# Patient Record
Sex: FEMALE | Race: WHITE | NOT HISPANIC OR LATINO | Employment: OTHER | URBAN - METROPOLITAN AREA
[De-identification: names, ages, dates, MRNs, and addresses within clinical notes are randomized per-mention and may not be internally consistent; named-entity substitution may affect disease eponyms.]

---

## 2018-11-12 ENCOUNTER — MEDICAL CORRESPONDENCE (OUTPATIENT)
Dept: HEALTH INFORMATION MANAGEMENT | Facility: CLINIC | Age: 63
End: 2018-11-12

## 2018-11-12 DIAGNOSIS — Z47.1 AFTERCARE FOLLOWING JOINT REPLACEMENT: Primary | ICD-10-CM

## 2018-12-19 ENCOUNTER — HOSPITAL ENCOUNTER (OUTPATIENT)
Dept: LAB | Facility: CLINIC | Age: 63
Discharge: HOME | End: 2018-12-19
Attending: ORTHOPAEDIC SURGERY | Admitting: ORTHOPAEDIC SURGERY
Payer: COMMERCIAL

## 2018-12-19 DIAGNOSIS — Z47.1 AFTERCARE FOLLOWING JOINT REPLACEMENT: ICD-10-CM

## 2018-12-19 LAB
BASOPHILS # BLD AUTO: 0 10E9/L (ref 0–0.2)
BASOPHILS NFR BLD AUTO: 0.4 %
CRP SERPL-MCNC: <2.9 MG/L (ref 0–8)
DIFFERENTIAL METHOD BLD: NORMAL
EOSINOPHIL # BLD AUTO: 0.1 10E9/L (ref 0–0.7)
EOSINOPHIL NFR BLD AUTO: 1.9 %
ERYTHROCYTE [DISTWIDTH] IN BLOOD BY AUTOMATED COUNT: 12.2 % (ref 10–15)
ERYTHROCYTE [SEDIMENTATION RATE] IN BLOOD BY WESTERGREN METHOD: 10 MM/H (ref 0–30)
HCT VFR BLD AUTO: 38.2 % (ref 35–47)
HGB BLD-MCNC: 12.9 G/DL (ref 11.7–15.7)
IMM GRANULOCYTES # BLD: 0 10E9/L (ref 0–0.4)
IMM GRANULOCYTES NFR BLD: 0.2 %
LYMPHOCYTES # BLD AUTO: 1.3 10E9/L (ref 0.8–5.3)
LYMPHOCYTES NFR BLD AUTO: 27 %
MCH RBC QN AUTO: 31 PG (ref 26.5–33)
MCHC RBC AUTO-ENTMCNC: 33.8 G/DL (ref 31.5–36.5)
MCV RBC AUTO: 92 FL (ref 78–100)
MONOCYTES # BLD AUTO: 0.3 10E9/L (ref 0–1.3)
MONOCYTES NFR BLD AUTO: 7.1 %
NEUTROPHILS # BLD AUTO: 2.9 10E9/L (ref 1.6–8.3)
NEUTROPHILS NFR BLD AUTO: 63.4 %
NRBC # BLD AUTO: 0 10*3/UL
NRBC BLD AUTO-RTO: 0 /100
PLATELET # BLD AUTO: 197 10E9/L (ref 150–450)
RBC # BLD AUTO: 4.16 10E12/L (ref 3.8–5.2)
WBC # BLD AUTO: 4.6 10E9/L (ref 4–11)

## 2018-12-19 PROCEDURE — 85025 COMPLETE CBC W/AUTO DIFF WBC: CPT | Performed by: ORTHOPAEDIC SURGERY

## 2018-12-19 PROCEDURE — 36415 COLL VENOUS BLD VENIPUNCTURE: CPT | Performed by: ORTHOPAEDIC SURGERY

## 2018-12-19 PROCEDURE — 85652 RBC SED RATE AUTOMATED: CPT | Performed by: ORTHOPAEDIC SURGERY

## 2018-12-19 PROCEDURE — 86140 C-REACTIVE PROTEIN: CPT | Performed by: ORTHOPAEDIC SURGERY

## 2019-02-25 ENCOUNTER — TRANSFERRED RECORDS (OUTPATIENT)
Dept: HEALTH INFORMATION MANAGEMENT | Facility: CLINIC | Age: 64
End: 2019-02-25

## 2019-02-28 ENCOUNTER — HOSPITAL ENCOUNTER (OUTPATIENT)
Dept: LAB | Facility: CLINIC | Age: 64
Discharge: HOME | End: 2019-02-28
Attending: ORTHOPAEDIC SURGERY | Admitting: ORTHOPAEDIC SURGERY
Payer: COMMERCIAL

## 2019-02-28 DIAGNOSIS — Z01.812 PRE-OPERATIVE LABORATORY EXAMINATION: ICD-10-CM

## 2019-02-28 LAB
MRSA DNA SPEC QL NAA+PROBE: NEGATIVE
SPECIMEN SOURCE: NORMAL

## 2019-02-28 PROCEDURE — 40000830 ZZHCL STATISTIC STAPH AUREUS METH RESIST SCREEN PCR: Performed by: ORTHOPAEDIC SURGERY

## 2019-02-28 PROCEDURE — 87640 STAPH A DNA AMP PROBE: CPT | Performed by: ORTHOPAEDIC SURGERY

## 2019-02-28 PROCEDURE — 87641 MR-STAPH DNA AMP PROBE: CPT | Performed by: ORTHOPAEDIC SURGERY

## 2019-02-28 PROCEDURE — 40000829 ZZHCL STATISTIC STAPH AUREUS SUSCEPT SCREEN PCR: Performed by: ORTHOPAEDIC SURGERY

## 2019-03-01 NOTE — PLAN OF CARE
Patient returned call about MSSA nasal screen. Explained Mupirocin use and need for extra CHG showers. Rx for Mupirocin called to [facility] in [place]  [phone].

## 2019-03-01 NOTE — PLAN OF CARE
Patient returned call about positive MSSA nasal screen. Mupirocin use and need for extra CHG showers discussed and pt verbalizes understanding. Rx for Mupirocin called to [facility] in [place].  [phone].

## 2019-03-01 NOTE — PLAN OF CARE
Message left for patient and Dr. Hilliard about positive MSSA nasal screen. Requested patient call back to go over instructions in more detail.

## 2019-03-19 RX ORDER — ACETAMINOPHEN 325 MG/1
325-650 TABLET ORAL EVERY 6 HOURS PRN
Status: ON HOLD | COMMUNITY
End: 2019-03-22

## 2019-03-19 RX ORDER — TRAZODONE HYDROCHLORIDE 100 MG/1
100 TABLET ORAL AT BEDTIME
COMMUNITY

## 2019-03-19 RX ORDER — NAPROXEN SODIUM 220 MG
220 TABLET ORAL DAILY PRN
Status: ON HOLD | COMMUNITY
End: 2019-03-22

## 2019-03-19 RX ORDER — VENLAFAXINE HYDROCHLORIDE 75 MG/1
75 TABLET, EXTENDED RELEASE ORAL
COMMUNITY
End: 2019-03-21

## 2019-03-19 RX ORDER — VENLAFAXINE HYDROCHLORIDE 150 MG/1
150 TABLET, EXTENDED RELEASE ORAL
COMMUNITY
End: 2019-03-21

## 2019-03-19 RX ORDER — LEVOTHYROXINE SODIUM 150 UG/1
150 TABLET ORAL
COMMUNITY

## 2019-03-21 RX ORDER — VENLAFAXINE HYDROCHLORIDE 75 MG/1
75 TABLET, EXTENDED RELEASE ORAL DAILY
COMMUNITY
End: 2023-12-27

## 2019-03-21 RX ORDER — SIMVASTATIN 10 MG
10 TABLET ORAL EVERY EVENING
COMMUNITY

## 2019-03-22 ENCOUNTER — APPOINTMENT (OUTPATIENT)
Dept: GENERAL RADIOLOGY | Facility: CLINIC | Age: 64
End: 2019-03-22
Attending: ORTHOPAEDIC SURGERY
Payer: COMMERCIAL

## 2019-03-22 ENCOUNTER — ANESTHESIA EVENT (OUTPATIENT)
Dept: SURGERY | Facility: CLINIC | Age: 64
End: 2019-03-22
Payer: COMMERCIAL

## 2019-03-22 ENCOUNTER — ANESTHESIA (OUTPATIENT)
Dept: SURGERY | Facility: CLINIC | Age: 64
End: 2019-03-22
Payer: COMMERCIAL

## 2019-03-22 ENCOUNTER — APPOINTMENT (OUTPATIENT)
Dept: PHYSICAL THERAPY | Facility: CLINIC | Age: 64
End: 2019-03-22
Attending: ORTHOPAEDIC SURGERY
Payer: COMMERCIAL

## 2019-03-22 ENCOUNTER — HOSPITAL ENCOUNTER (INPATIENT)
Facility: CLINIC | Age: 64
LOS: 1 days | Discharge: HOME | End: 2019-03-24
Attending: ORTHOPAEDIC SURGERY | Admitting: ORTHOPAEDIC SURGERY
Payer: COMMERCIAL

## 2019-03-22 DIAGNOSIS — Z96.652 S/P REVISION OF TOTAL KNEE, LEFT: Primary | ICD-10-CM

## 2019-03-22 LAB
CREAT SERPL-MCNC: 0.84 MG/DL (ref 0.52–1.04)
GFR SERPL CREATININE-BSD FRML MDRD: 74 ML/MIN/{1.73_M2}
GRAM STN SPEC: NORMAL
HGB BLD-MCNC: 12.7 G/DL (ref 11.7–15.7)
Lab: NORMAL
SPECIMEN SOURCE: NORMAL

## 2019-03-22 PROCEDURE — 71000012 ZZH RECOVERY PHASE 1 LEVEL 1 FIRST HR: Performed by: ORTHOPAEDIC SURGERY

## 2019-03-22 PROCEDURE — 25800025 ZZH RX 258: Performed by: ORTHOPAEDIC SURGERY

## 2019-03-22 PROCEDURE — 40000169 ZZH STATISTIC PRE-PROCEDURE ASSESSMENT I: Performed by: ORTHOPAEDIC SURGERY

## 2019-03-22 PROCEDURE — 87070 CULTURE OTHR SPECIMN AEROBIC: CPT | Performed by: ORTHOPAEDIC SURGERY

## 2019-03-22 PROCEDURE — 25000125 ZZHC RX 250: Performed by: PHYSICIAN ASSISTANT

## 2019-03-22 PROCEDURE — 88305 TISSUE EXAM BY PATHOLOGIST: CPT | Mod: 26 | Performed by: ORTHOPAEDIC SURGERY

## 2019-03-22 PROCEDURE — 97116 GAIT TRAINING THERAPY: CPT | Mod: GP

## 2019-03-22 PROCEDURE — 25000132 ZZH RX MED GY IP 250 OP 250 PS 637: Performed by: ORTHOPAEDIC SURGERY

## 2019-03-22 PROCEDURE — 87205 SMEAR GRAM STAIN: CPT | Performed by: ORTHOPAEDIC SURGERY

## 2019-03-22 PROCEDURE — 25000125 ZZHC RX 250: Performed by: NURSE ANESTHETIST, CERTIFIED REGISTERED

## 2019-03-22 PROCEDURE — 25000128 H RX IP 250 OP 636: Performed by: PHYSICIAN ASSISTANT

## 2019-03-22 PROCEDURE — 40000275 ZZH STATISTIC RCP TIME EA 10 MIN

## 2019-03-22 PROCEDURE — 36000069 ZZH SURGERY LEVEL 5 EA 15 ADDTL MIN: Performed by: ORTHOPAEDIC SURGERY

## 2019-03-22 PROCEDURE — 25000128 H RX IP 250 OP 636: Performed by: ANESTHESIOLOGY

## 2019-03-22 PROCEDURE — 97530 THERAPEUTIC ACTIVITIES: CPT | Mod: GP

## 2019-03-22 PROCEDURE — 25800030 ZZH RX IP 258 OP 636: Performed by: NURSE ANESTHETIST, CERTIFIED REGISTERED

## 2019-03-22 PROCEDURE — 25800030 ZZH RX IP 258 OP 636: Performed by: ORTHOPAEDIC SURGERY

## 2019-03-22 PROCEDURE — 0SRW0J9 REPLACEMENT OF LEFT KNEE JOINT, TIBIAL SURFACE WITH SYNTHETIC SUBSTITUTE, CEMENTED, OPEN APPROACH: ICD-10-PCS | Performed by: ORTHOPAEDIC SURGERY

## 2019-03-22 PROCEDURE — 25800030 ZZH RX IP 258 OP 636: Performed by: PHYSICIAN ASSISTANT

## 2019-03-22 PROCEDURE — 37000009 ZZH ANESTHESIA TECHNICAL FEE, EACH ADDTL 15 MIN: Performed by: ORTHOPAEDIC SURGERY

## 2019-03-22 PROCEDURE — 36415 COLL VENOUS BLD VENIPUNCTURE: CPT | Performed by: PHYSICIAN ASSISTANT

## 2019-03-22 PROCEDURE — 87075 CULTR BACTERIA EXCEPT BLOOD: CPT | Performed by: ORTHOPAEDIC SURGERY

## 2019-03-22 PROCEDURE — 25000128 H RX IP 250 OP 636: Performed by: NURSE ANESTHETIST, CERTIFIED REGISTERED

## 2019-03-22 PROCEDURE — 37000008 ZZH ANESTHESIA TECHNICAL FEE, 1ST 30 MIN: Performed by: ORTHOPAEDIC SURGERY

## 2019-03-22 PROCEDURE — 97161 PT EVAL LOW COMPLEX 20 MIN: CPT | Mod: GP

## 2019-03-22 PROCEDURE — 85018 HEMOGLOBIN: CPT | Performed by: PHYSICIAN ASSISTANT

## 2019-03-22 PROCEDURE — C1776 JOINT DEVICE (IMPLANTABLE): HCPCS | Performed by: ORTHOPAEDIC SURGERY

## 2019-03-22 PROCEDURE — 27810169 ZZH OR IMPLANT GENERAL: Performed by: ORTHOPAEDIC SURGERY

## 2019-03-22 PROCEDURE — 71000013 ZZH RECOVERY PHASE 1 LEVEL 1 EA ADDTL HR: Performed by: ORTHOPAEDIC SURGERY

## 2019-03-22 PROCEDURE — 36000067 ZZH SURGERY LEVEL 5 1ST 30 MIN: Performed by: ORTHOPAEDIC SURGERY

## 2019-03-22 PROCEDURE — 88331 PATH CONSLTJ SURG 1 BLK 1SPC: CPT | Performed by: ORTHOPAEDIC SURGERY

## 2019-03-22 PROCEDURE — 88305 TISSUE EXAM BY PATHOLOGIST: CPT | Performed by: ORTHOPAEDIC SURGERY

## 2019-03-22 PROCEDURE — 25000132 ZZH RX MED GY IP 250 OP 250 PS 637: Performed by: PHYSICIAN ASSISTANT

## 2019-03-22 PROCEDURE — 25800030 ZZH RX IP 258 OP 636: Performed by: ANESTHESIOLOGY

## 2019-03-22 PROCEDURE — 27210794 ZZH OR GENERAL SUPPLY STERILE: Performed by: ORTHOPAEDIC SURGERY

## 2019-03-22 PROCEDURE — 0SPW0JZ REMOVAL OF SYNTHETIC SUBSTITUTE FROM LEFT KNEE JOINT, TIBIAL SURFACE, OPEN APPROACH: ICD-10-PCS | Performed by: ORTHOPAEDIC SURGERY

## 2019-03-22 PROCEDURE — 25000125 ZZHC RX 250: Performed by: ORTHOPAEDIC SURGERY

## 2019-03-22 PROCEDURE — 97110 THERAPEUTIC EXERCISES: CPT | Mod: GP

## 2019-03-22 PROCEDURE — 25000128 H RX IP 250 OP 636: Performed by: ORTHOPAEDIC SURGERY

## 2019-03-22 PROCEDURE — 40000986 XR KNEE PORT LT 1/2 VW: Mod: LT

## 2019-03-22 PROCEDURE — 82565 ASSAY OF CREATININE: CPT | Performed by: PHYSICIAN ASSISTANT

## 2019-03-22 PROCEDURE — 88331 PATH CONSLTJ SURG 1 BLK 1SPC: CPT | Mod: 26 | Performed by: ORTHOPAEDIC SURGERY

## 2019-03-22 DEVICE — BONE CEMENT RADIOPAQUE SIMPLEX HV FULL DOSE 6194-1-001: Type: IMPLANTABLE DEVICE | Site: KNEE | Status: FUNCTIONAL

## 2019-03-22 DEVICE — BONE CEMENT RESTRICTOR BUCK FEMORAL 25MM 129419: Type: IMPLANTABLE DEVICE | Site: KNEE | Status: FUNCTIONAL

## 2019-03-22 DEVICE — IMPLANTABLE DEVICE: Type: IMPLANTABLE DEVICE | Site: KNEE | Status: FUNCTIONAL

## 2019-03-22 DEVICE — IMP BONE CEMENT SIMPLEX W/GENTAMICIN 6195-1-001: Type: IMPLANTABLE DEVICE | Site: KNEE | Status: FUNCTIONAL

## 2019-03-22 RX ORDER — ONDANSETRON 4 MG/1
4 TABLET, ORALLY DISINTEGRATING ORAL EVERY 30 MIN PRN
Status: DISCONTINUED | OUTPATIENT
Start: 2019-03-22 | End: 2019-03-22 | Stop reason: HOSPADM

## 2019-03-22 RX ORDER — FENTANYL CITRATE 50 UG/ML
25-100 INJECTION, SOLUTION INTRAMUSCULAR; INTRAVENOUS
Status: DISCONTINUED | OUTPATIENT
Start: 2019-03-22 | End: 2019-03-22 | Stop reason: HOSPADM

## 2019-03-22 RX ORDER — ONDANSETRON 2 MG/ML
4 INJECTION INTRAMUSCULAR; INTRAVENOUS EVERY 30 MIN PRN
Status: DISCONTINUED | OUTPATIENT
Start: 2019-03-22 | End: 2019-03-22 | Stop reason: HOSPADM

## 2019-03-22 RX ORDER — SODIUM CHLORIDE, SODIUM LACTATE, POTASSIUM CHLORIDE, CALCIUM CHLORIDE 600; 310; 30; 20 MG/100ML; MG/100ML; MG/100ML; MG/100ML
INJECTION, SOLUTION INTRAVENOUS CONTINUOUS
Status: DISCONTINUED | OUTPATIENT
Start: 2019-03-22 | End: 2019-03-22 | Stop reason: HOSPADM

## 2019-03-22 RX ORDER — ONDANSETRON 2 MG/ML
4 INJECTION INTRAMUSCULAR; INTRAVENOUS EVERY 6 HOURS PRN
Status: DISCONTINUED | OUTPATIENT
Start: 2019-03-22 | End: 2019-03-24 | Stop reason: HOSPADM

## 2019-03-22 RX ORDER — FENTANYL CITRATE 50 UG/ML
25-50 INJECTION, SOLUTION INTRAMUSCULAR; INTRAVENOUS
Status: DISCONTINUED | OUTPATIENT
Start: 2019-03-22 | End: 2019-03-22 | Stop reason: HOSPADM

## 2019-03-22 RX ORDER — TEMAZEPAM 7.5 MG/1
7.5 CAPSULE ORAL
Status: DISCONTINUED | OUTPATIENT
Start: 2019-03-23 | End: 2019-03-24 | Stop reason: HOSPADM

## 2019-03-22 RX ORDER — PROPOFOL 10 MG/ML
INJECTION, EMULSION INTRAVENOUS PRN
Status: DISCONTINUED | OUTPATIENT
Start: 2019-03-22 | End: 2019-03-22

## 2019-03-22 RX ORDER — AMOXICILLIN 250 MG
2 CAPSULE ORAL 2 TIMES DAILY
Status: DISCONTINUED | OUTPATIENT
Start: 2019-03-22 | End: 2019-03-24 | Stop reason: HOSPADM

## 2019-03-22 RX ORDER — HYDROXYZINE HYDROCHLORIDE 25 MG/1
25 TABLET, FILM COATED ORAL EVERY 6 HOURS PRN
Status: DISCONTINUED | OUTPATIENT
Start: 2019-03-22 | End: 2019-03-24 | Stop reason: HOSPADM

## 2019-03-22 RX ORDER — NALOXONE HYDROCHLORIDE 0.4 MG/ML
.1-.4 INJECTION, SOLUTION INTRAMUSCULAR; INTRAVENOUS; SUBCUTANEOUS
Status: DISCONTINUED | OUTPATIENT
Start: 2019-03-22 | End: 2019-03-24 | Stop reason: HOSPADM

## 2019-03-22 RX ORDER — SIMVASTATIN 20 MG
20 TABLET ORAL AT BEDTIME
Status: DISCONTINUED | OUTPATIENT
Start: 2019-03-22 | End: 2019-03-24 | Stop reason: HOSPADM

## 2019-03-22 RX ORDER — LEVOTHYROXINE SODIUM 150 UG/1
150 TABLET ORAL
Status: DISCONTINUED | OUTPATIENT
Start: 2019-03-23 | End: 2019-03-24 | Stop reason: HOSPADM

## 2019-03-22 RX ORDER — ACETAMINOPHEN 325 MG/1
650 TABLET ORAL EVERY 4 HOURS PRN
Status: DISCONTINUED | OUTPATIENT
Start: 2019-03-25 | End: 2019-03-24 | Stop reason: HOSPADM

## 2019-03-22 RX ORDER — DEXTROSE MONOHYDRATE, SODIUM CHLORIDE, AND POTASSIUM CHLORIDE 50; 1.49; 4.5 G/1000ML; G/1000ML; G/1000ML
INJECTION, SOLUTION INTRAVENOUS CONTINUOUS
Status: DISCONTINUED | OUTPATIENT
Start: 2019-03-22 | End: 2019-03-24 | Stop reason: HOSPADM

## 2019-03-22 RX ORDER — HYDROMORPHONE HYDROCHLORIDE 1 MG/ML
.3-.5 INJECTION, SOLUTION INTRAMUSCULAR; INTRAVENOUS; SUBCUTANEOUS EVERY 5 MIN PRN
Status: DISCONTINUED | OUTPATIENT
Start: 2019-03-22 | End: 2019-03-22 | Stop reason: HOSPADM

## 2019-03-22 RX ORDER — AMOXICILLIN 250 MG
1 CAPSULE ORAL 2 TIMES DAILY
Status: DISCONTINUED | OUTPATIENT
Start: 2019-03-22 | End: 2019-03-24 | Stop reason: HOSPADM

## 2019-03-22 RX ORDER — CEFAZOLIN SODIUM 1 G/3ML
1 INJECTION, POWDER, FOR SOLUTION INTRAMUSCULAR; INTRAVENOUS SEE ADMIN INSTRUCTIONS
Status: DISCONTINUED | OUTPATIENT
Start: 2019-03-22 | End: 2019-03-22 | Stop reason: HOSPADM

## 2019-03-22 RX ORDER — FENTANYL CITRATE 50 UG/ML
INJECTION, SOLUTION INTRAMUSCULAR; INTRAVENOUS PRN
Status: DISCONTINUED | OUTPATIENT
Start: 2019-03-22 | End: 2019-03-22

## 2019-03-22 RX ORDER — ONDANSETRON 4 MG/1
4 TABLET, ORALLY DISINTEGRATING ORAL EVERY 6 HOURS PRN
Status: DISCONTINUED | OUTPATIENT
Start: 2019-03-22 | End: 2019-03-24 | Stop reason: HOSPADM

## 2019-03-22 RX ORDER — GLYCOPYRROLATE 0.2 MG/ML
INJECTION, SOLUTION INTRAMUSCULAR; INTRAVENOUS PRN
Status: DISCONTINUED | OUTPATIENT
Start: 2019-03-22 | End: 2019-03-22

## 2019-03-22 RX ORDER — CELECOXIB 200 MG/1
400 CAPSULE ORAL ONCE
Status: COMPLETED | OUTPATIENT
Start: 2019-03-22 | End: 2019-03-22

## 2019-03-22 RX ORDER — KETOROLAC TROMETHAMINE 30 MG/ML
INJECTION, SOLUTION INTRAMUSCULAR; INTRAVENOUS PRN
Status: DISCONTINUED | OUTPATIENT
Start: 2019-03-22 | End: 2019-03-22 | Stop reason: HOSPADM

## 2019-03-22 RX ORDER — DEXAMETHASONE SODIUM PHOSPHATE 4 MG/ML
INJECTION, SOLUTION INTRA-ARTICULAR; INTRALESIONAL; INTRAMUSCULAR; INTRAVENOUS; SOFT TISSUE PRN
Status: DISCONTINUED | OUTPATIENT
Start: 2019-03-22 | End: 2019-03-22

## 2019-03-22 RX ORDER — HYDROMORPHONE HYDROCHLORIDE 1 MG/ML
.3-.5 INJECTION, SOLUTION INTRAMUSCULAR; INTRAVENOUS; SUBCUTANEOUS
Status: DISCONTINUED | OUTPATIENT
Start: 2019-03-22 | End: 2019-03-24 | Stop reason: HOSPADM

## 2019-03-22 RX ORDER — PROCHLORPERAZINE MALEATE 10 MG
10 TABLET ORAL EVERY 6 HOURS PRN
Status: DISCONTINUED | OUTPATIENT
Start: 2019-03-22 | End: 2019-03-24 | Stop reason: HOSPADM

## 2019-03-22 RX ORDER — PROPOFOL 10 MG/ML
INJECTION, EMULSION INTRAVENOUS CONTINUOUS PRN
Status: DISCONTINUED | OUTPATIENT
Start: 2019-03-22 | End: 2019-03-22

## 2019-03-22 RX ORDER — CEFAZOLIN SODIUM 2 G/100ML
2 INJECTION, SOLUTION INTRAVENOUS
Status: COMPLETED | OUTPATIENT
Start: 2019-03-22 | End: 2019-03-22

## 2019-03-22 RX ORDER — NEOSTIGMINE METHYLSULFATE 1 MG/ML
VIAL (ML) INJECTION PRN
Status: DISCONTINUED | OUTPATIENT
Start: 2019-03-22 | End: 2019-03-22

## 2019-03-22 RX ORDER — MAGNESIUM HYDROXIDE 1200 MG/15ML
LIQUID ORAL PRN
Status: DISCONTINUED | OUTPATIENT
Start: 2019-03-22 | End: 2019-03-22 | Stop reason: HOSPADM

## 2019-03-22 RX ORDER — VENLAFAXINE HYDROCHLORIDE 150 MG/1
150 TABLET, EXTENDED RELEASE ORAL DAILY
COMMUNITY
End: 2023-12-27

## 2019-03-22 RX ORDER — OXYCODONE HYDROCHLORIDE 5 MG/1
5-10 TABLET ORAL
Status: DISCONTINUED | OUTPATIENT
Start: 2019-03-22 | End: 2019-03-24 | Stop reason: HOSPADM

## 2019-03-22 RX ORDER — VENLAFAXINE HYDROCHLORIDE 75 MG/1
225 CAPSULE, EXTENDED RELEASE ORAL DAILY
Status: DISCONTINUED | OUTPATIENT
Start: 2019-03-23 | End: 2019-03-24 | Stop reason: HOSPADM

## 2019-03-22 RX ORDER — LIDOCAINE 40 MG/G
CREAM TOPICAL
Status: DISCONTINUED | OUTPATIENT
Start: 2019-03-22 | End: 2019-03-24 | Stop reason: HOSPADM

## 2019-03-22 RX ORDER — ONDANSETRON 2 MG/ML
INJECTION INTRAMUSCULAR; INTRAVENOUS PRN
Status: DISCONTINUED | OUTPATIENT
Start: 2019-03-22 | End: 2019-03-22

## 2019-03-22 RX ORDER — KETOROLAC TROMETHAMINE 30 MG/ML
30 INJECTION, SOLUTION INTRAMUSCULAR; INTRAVENOUS EVERY 6 HOURS
Status: COMPLETED | OUTPATIENT
Start: 2019-03-22 | End: 2019-03-23

## 2019-03-22 RX ORDER — ACETAMINOPHEN 500 MG
1000 TABLET ORAL ONCE
Status: COMPLETED | OUTPATIENT
Start: 2019-03-22 | End: 2019-03-22

## 2019-03-22 RX ORDER — KETOROLAC TROMETHAMINE 30 MG/ML
30 INJECTION, SOLUTION INTRAMUSCULAR; INTRAVENOUS
Status: DISCONTINUED | OUTPATIENT
Start: 2019-03-22 | End: 2019-03-22 | Stop reason: HOSPADM

## 2019-03-22 RX ORDER — PREGABALIN 150 MG/1
150 CAPSULE ORAL DAILY
Status: COMPLETED | OUTPATIENT
Start: 2019-03-22 | End: 2019-03-22

## 2019-03-22 RX ORDER — VENLAFAXINE HYDROCHLORIDE 75 MG/1
75 TABLET, EXTENDED RELEASE ORAL DAILY
Status: DISCONTINUED | OUTPATIENT
Start: 2019-03-22 | End: 2019-03-22

## 2019-03-22 RX ORDER — CEFAZOLIN SODIUM 1 G/3ML
1 INJECTION, POWDER, FOR SOLUTION INTRAMUSCULAR; INTRAVENOUS EVERY 8 HOURS
Status: COMPLETED | OUTPATIENT
Start: 2019-03-22 | End: 2019-03-23

## 2019-03-22 RX ORDER — TRAZODONE HYDROCHLORIDE 100 MG/1
100 TABLET ORAL
Status: DISCONTINUED | OUTPATIENT
Start: 2019-03-22 | End: 2019-03-24 | Stop reason: HOSPADM

## 2019-03-22 RX ORDER — TRAZODONE HYDROCHLORIDE 50 MG/1
100 TABLET, FILM COATED ORAL AT BEDTIME
Status: DISCONTINUED | OUTPATIENT
Start: 2019-03-22 | End: 2019-03-22

## 2019-03-22 RX ORDER — NALOXONE HYDROCHLORIDE 0.4 MG/ML
.1-.4 INJECTION, SOLUTION INTRAMUSCULAR; INTRAVENOUS; SUBCUTANEOUS
Status: DISCONTINUED | OUTPATIENT
Start: 2019-03-22 | End: 2019-03-22

## 2019-03-22 RX ORDER — ACETAMINOPHEN 325 MG/1
975 TABLET ORAL EVERY 8 HOURS
Status: DISCONTINUED | OUTPATIENT
Start: 2019-03-22 | End: 2019-03-24 | Stop reason: HOSPADM

## 2019-03-22 RX ORDER — CEFAZOLIN SODIUM 1 G/3ML
INJECTION, POWDER, FOR SOLUTION INTRAMUSCULAR; INTRAVENOUS PRN
Status: DISCONTINUED | OUTPATIENT
Start: 2019-03-22 | End: 2019-03-22

## 2019-03-22 RX ORDER — VANCOMYCIN HYDROCHLORIDE 1 G/20ML
INJECTION, POWDER, LYOPHILIZED, FOR SOLUTION INTRAVENOUS PRN
Status: DISCONTINUED | OUTPATIENT
Start: 2019-03-22 | End: 2019-03-22 | Stop reason: HOSPADM

## 2019-03-22 RX ADMIN — ROCURONIUM BROMIDE 20 MG: 10 INJECTION INTRAVENOUS at 08:13

## 2019-03-22 RX ADMIN — OXYCODONE HYDROCHLORIDE 10 MG: 5 TABLET ORAL at 16:12

## 2019-03-22 RX ADMIN — PROPOFOL 200 MG: 10 INJECTION, EMULSION INTRAVENOUS at 07:34

## 2019-03-22 RX ADMIN — KETOROLAC TROMETHAMINE 30 MG: 30 INJECTION, SOLUTION INTRAMUSCULAR; INTRAVENOUS at 17:34

## 2019-03-22 RX ADMIN — ROPIVACAINE HYDROCHLORIDE 20 ML GIVEN: 5 INJECTION, SOLUTION EPIDURAL; INFILTRATION; PERINEURAL at 07:45

## 2019-03-22 RX ADMIN — SENNOSIDES AND DOCUSATE SODIUM 1 TABLET: 8.6; 5 TABLET ORAL at 22:09

## 2019-03-22 RX ADMIN — POTASSIUM CHLORIDE, DEXTROSE MONOHYDRATE AND SODIUM CHLORIDE: 150; 5; 450 INJECTION, SOLUTION INTRAVENOUS at 22:16

## 2019-03-22 RX ADMIN — ROCURONIUM BROMIDE 10 MG: 10 INJECTION INTRAVENOUS at 09:13

## 2019-03-22 RX ADMIN — HYDROMORPHONE HYDROCHLORIDE 0.5 MG: 1 INJECTION, SOLUTION INTRAMUSCULAR; INTRAVENOUS; SUBCUTANEOUS at 11:53

## 2019-03-22 RX ADMIN — GLYCOPYRROLATE 0.4 MG: 0.2 INJECTION, SOLUTION INTRAMUSCULAR; INTRAVENOUS at 10:29

## 2019-03-22 RX ADMIN — MIDAZOLAM 2 MG: 1 INJECTION INTRAMUSCULAR; INTRAVENOUS at 07:30

## 2019-03-22 RX ADMIN — SODIUM CHLORIDE, POTASSIUM CHLORIDE, SODIUM LACTATE AND CALCIUM CHLORIDE: 600; 310; 30; 20 INJECTION, SOLUTION INTRAVENOUS at 09:46

## 2019-03-22 RX ADMIN — MIDAZOLAM HYDROCHLORIDE 2 MG: 1 INJECTION, SOLUTION INTRAMUSCULAR; INTRAVENOUS at 06:50

## 2019-03-22 RX ADMIN — SODIUM CHLORIDE 1 G: 9 INJECTION, SOLUTION INTRAVENOUS at 07:47

## 2019-03-22 RX ADMIN — PREGABALIN 150 MG: 150 CAPSULE ORAL at 06:18

## 2019-03-22 RX ADMIN — FENTANYL CITRATE 100 MCG: 50 INJECTION, SOLUTION INTRAMUSCULAR; INTRAVENOUS at 08:14

## 2019-03-22 RX ADMIN — FENTANYL CITRATE 50 MCG: 50 INJECTION, SOLUTION INTRAMUSCULAR; INTRAVENOUS at 06:50

## 2019-03-22 RX ADMIN — SODIUM CHLORIDE, POTASSIUM CHLORIDE, SODIUM LACTATE AND CALCIUM CHLORIDE: 600; 310; 30; 20 INJECTION, SOLUTION INTRAVENOUS at 06:18

## 2019-03-22 RX ADMIN — PROPOFOL 180 MCG/KG/MIN: 10 INJECTION, EMULSION INTRAVENOUS at 07:34

## 2019-03-22 RX ADMIN — POTASSIUM CHLORIDE, DEXTROSE MONOHYDRATE AND SODIUM CHLORIDE: 150; 5; 450 INJECTION, SOLUTION INTRAVENOUS at 14:31

## 2019-03-22 RX ADMIN — DEXMEDETOMIDINE HYDROCHLORIDE 0.3 MCG/KG/HR: 100 INJECTION, SOLUTION INTRAVENOUS at 07:34

## 2019-03-22 RX ADMIN — DEXAMETHASONE SODIUM PHOSPHATE 8 MG: 4 INJECTION, SOLUTION INTRA-ARTICULAR; INTRALESIONAL; INTRAMUSCULAR; INTRAVENOUS; SOFT TISSUE at 07:52

## 2019-03-22 RX ADMIN — DEXMEDETOMIDINE HYDROCHLORIDE 20 MCG: 100 INJECTION, SOLUTION INTRAVENOUS at 07:34

## 2019-03-22 RX ADMIN — OXYCODONE HYDROCHLORIDE 5 MG: 5 TABLET ORAL at 22:12

## 2019-03-22 RX ADMIN — NEOSTIGMINE METHYLSULFATE 3 MG: 1 INJECTION, SOLUTION INTRAVENOUS at 10:29

## 2019-03-22 RX ADMIN — CEFAZOLIN 1 G: 1 INJECTION, POWDER, FOR SOLUTION INTRAMUSCULAR; INTRAVENOUS at 17:39

## 2019-03-22 RX ADMIN — CELECOXIB 400 MG: 200 CAPSULE ORAL at 06:17

## 2019-03-22 RX ADMIN — ROCURONIUM BROMIDE 10 MG: 10 INJECTION INTRAVENOUS at 09:46

## 2019-03-22 RX ADMIN — ROCURONIUM BROMIDE 10 MG: 10 INJECTION INTRAVENOUS at 09:03

## 2019-03-22 RX ADMIN — TRAZODONE HYDROCHLORIDE 100 MG: 100 TABLET ORAL at 22:14

## 2019-03-22 RX ADMIN — SODIUM CHLORIDE 1 G: 9 INJECTION, SOLUTION INTRAVENOUS at 10:14

## 2019-03-22 RX ADMIN — ROCURONIUM BROMIDE 50 MG: 10 INJECTION INTRAVENOUS at 07:34

## 2019-03-22 RX ADMIN — ONDANSETRON 4 MG: 2 INJECTION INTRAMUSCULAR; INTRAVENOUS at 10:13

## 2019-03-22 RX ADMIN — SIMVASTATIN 20 MG: 20 TABLET, FILM COATED ORAL at 22:09

## 2019-03-22 RX ADMIN — CEFAZOLIN SODIUM 2 G: 2 INJECTION, SOLUTION INTRAVENOUS at 07:38

## 2019-03-22 RX ADMIN — ACETAMINOPHEN 1000 MG: 500 TABLET, FILM COATED ORAL at 06:17

## 2019-03-22 RX ADMIN — CEFAZOLIN 1 G: 1 INJECTION, POWDER, FOR SOLUTION INTRAMUSCULAR; INTRAVENOUS at 09:43

## 2019-03-22 RX ADMIN — HYDROMORPHONE HYDROCHLORIDE 0.5 MG: 1 INJECTION, SOLUTION INTRAMUSCULAR; INTRAVENOUS; SUBCUTANEOUS at 08:07

## 2019-03-22 RX ADMIN — OXYCODONE HYDROCHLORIDE 5 MG: 5 TABLET ORAL at 19:07

## 2019-03-22 RX ADMIN — ACETAMINOPHEN 975 MG: 325 TABLET, FILM COATED ORAL at 17:34

## 2019-03-22 SDOH — HEALTH STABILITY: MENTAL HEALTH: HOW OFTEN DO YOU HAVE A DRINK CONTAINING ALCOHOL?: NEVER

## 2019-03-22 ASSESSMENT — MIFFLIN-ST. JEOR: SCORE: 1116.53

## 2019-03-22 NOTE — ANESTHESIA POSTPROCEDURE EVALUATION
Patient: Niecy Toro    Procedure(s):  LEFT TOTAL KNEE REVISION ARTHROPLASTY    Diagnosis:FAILED LEFT TOTAL KNEE  Diagnosis Additional Information: No value filed.    Anesthesia Type:  General, ETT, Periph. Nerve Block for postop pain    Note:  Anesthesia Post Evaluation    Patient location during evaluation: PACU  Patient participation: Able to fully participate in evaluation  Level of consciousness: awake  Pain management: adequate  Airway patency: patent  Cardiovascular status: acceptable  Respiratory status: acceptable  Hydration status: acceptable  PONV: none     Anesthetic complications: None          Last vitals:  Vitals:    03/22/19 1341 03/22/19 1402 03/22/19 1451   BP:  121/71 119/68   Pulse:      Resp: 15 16 15   Temp:  36.5  C (97.7  F) 36.4  C (97.5  F)   SpO2:  98% 97%         Electronically Signed By: Indira Hutchins MD, MD  March 22, 2019  3:35 PM

## 2019-03-22 NOTE — PROGRESS NOTES
This is a 63 year old female with PMHx of BONIFACIO, hyperlipidemia, hypothyroidism, osteopenia, depression, and insomnia who is s/p revision left total knee arthroplasty for failed left TKA. Surgery performed by Dr. Alamo.  ccs. General anesthesia used. Preop H&P reviewed. PTA Synthroid 150 mcg po every day, Effexor- mg po every day, Zocor 20 mg po every day, Trazodone 100 mg po at bedtime reordered.  Note, pt required CPAP temporarily following procedure, but is now saturating well on 3 LPM. Plan for discharge POD1 or 2 with family. DVT prophylaxis with  mg X6 weeks.     Given the above, will defer formal consult. Routine post-operative cares, IVF, DVT prophylaxis, and pain management to orthopedic service. Will check some basic lab work in the AM to assess for acute blood loss anemia given surgery. PT and OT to assess per Ortho. Bowel regimen in place while on pain regimen. No changes to PTA medication regimen at discharge unless issues arise throughout stay.  Happy to be reconsulted in the future if necessary. Appreciate consult.

## 2019-03-22 NOTE — PLAN OF CARE
Discharge Planner PT   Patient plan for discharge: Home with family support and OP PT.  Current status: PT completed, treatment initiated. Pt is s/p L TKA revision, POD 0. Pt lives with spouse in a 2SH with 2 TRACY (platform step). Pt's bed and bathroom is upstairs and has to negotiate a flight of stairs with 1 rail support. Pt was ind with all ADL's and gait prior the the sx. Pt was a RW and std cane at home. Currently pt is at SBA with supine<>sit. Pt sat at EOB x 6 min with supervision, stood with CGA and ambulated 200 ft using RW and CGA for safety. Pt participated in supine LE there ex with PT's guidance and assistance. AAROM of L knee: 4-74 deg.   Barriers to return to prior living situation: steps to enter, steps to access bed and bathroom, otherwise none anticipated.   Recommendations for discharge: Home with assist for stair management and OP PT.   Rationale for recommendations: Pt is anticipated to need assistance with stair management ot maintain safety. Pt will benefit from OP PT to achieve increased ROM, strength and independence with functional mobility.        Entered by: Edie Sibley 03/22/2019 5:05 PM

## 2019-03-22 NOTE — PROGRESS NOTES
Admission medication history interview status for the 3/22/2019  admission is complete. See EPIC admission navigator for prior to admission medications     Medication history source reliability:Good    Medication history interview source(s):Patient    Medication history resources (including written lists, pill bottles, clinic record):Patient mailed in her medication list prior to surgery    [facility]    Additional medication history information not noted on PTA med list :None    Time spent in this activity: 50 minutes    Prior to Admission medications    Medication Sig Last Dose Taking? Auth Provider   levothyroxine (SYNTHROID/LEVOTHROID) 150 MCG tablet Take 150 mcg by mouth every morning (before breakfast) 3/22/2019 at 0230 Yes Reported, Patient   mupirocin (BACTROBAN) 2 % nasal ointment Apply 1 g into each nare 2 times daily 3/21/2019 at PM Yes Reported, Patient   simvastatin (ZOCOR) 20 MG tablet Take 20 mg by mouth At Bedtime  3/21/2019 at HS Yes Reported, Patient   traZODone (DESYREL) 100 MG tablet Take 100 mg by mouth At Bedtime  Yes Reported, Patient   venlafaxine (EFFEXOR-ER) 150 MG 24 hr tablet Take 150 mg by mouth daily (Takes with 75 mg tablet = 225 mg dose) 3/22/2019 at 0230 Yes Reported, Patient   venlafaxine (EFFEXOR-ER) 75 MG 24 hr tablet Take 75 mg by mouth daily (Takes with 150 mg tablet = 225 mg dose) 3/22/2019 at 0330 Yes Reported, Patient

## 2019-03-22 NOTE — BRIEF OP NOTE
[facility]    Brief Operative Note    Pre-operative diagnosis: FAILED LEFT TOTAL KNEE  Post-operative diagnosis Failed left total knee tibial component  Procedure: Revision left total knee arthroplasty limited to the tibial component  Surgeon: Surgeon(s) and Role:     * Yohan Alamo MD - Primary     * Parisa Taylor PA-C - Assisting  Anesthesia: General   Estimated blood loss: 200 ml  Drains: None  Specimens:   ID Type Source Tests Collected by Time Destination   1 : LEFT KNEE WOUND Wound Knee, Left ANAEROBIC BACTERIAL CULTURE, GRAM STAIN, WOUND CULTURE AEROBIC BACTERIAL Yohan Alamo MD 3/22/2019  8:43 AM    A : LEFT KNEE Tissue Knee, Left SURGICAL PATHOLOGY EXAM Yohan Alamo MD 3/22/2019  8:38 AM    B :  Tissue Knee, Left SURGICAL PATHOLOGY EXAM Yohan Alamo MD 3/22/2019  9:04 AM      Findings:   None.  Complications: None.      Plan: Discharge home POD1 or 2 w/family.  DVT prophylaxis w/ASA 325mg daily x6 weeks.

## 2019-03-22 NOTE — PROGRESS NOTES
03/22/19 1600   Quick Adds   Type of Visit Initial PT Evaluation   Living Environment   Lives With spouse   Living Arrangements house   Home Accessibility stairs to enter home;stairs within home   Number of Stairs, Main Entrance 2  (platform step)   Stair Railings, Main Entrance none   Number of Stairs, Within Home, Primary other (see comments)  (12)   Stair Railings, Within Home, Primary railing on right side (ascending)   Transportation Anticipated family or friend will provide   Living Environment Comment Pt lives with spouse in a 2Sh with 2 TRACY without rail support. Pt's bed and bathroom is upstairs and has 1 rail support.    Self-Care   Usual Activity Tolerance good   Current Activity Tolerance moderate   Regular Exercise No   Equipment Currently Used at Home none   Activity/Exercise/Self-Care Comment Pt owns a RW and std cane at home. Pt was ind with all ADL's.    Functional Level Prior   Ambulation 0-->independent   Transferring 0-->independent   Toileting 0-->independent   Bathing 0-->independent   Communication 0-->understands/communicates without difficulty   Swallowing 0-->swallows foods/liquids without difficulty   Cognition 0 - no cognition issues reported   Fall history within last six months no   Which of the above functional risks had a recent onset or change? none   Prior Functional Level Comment Pt was ind with ambulation,    General Information   Onset of Illness/Injury or Date of Surgery - Date 03/22/19   Referring Physician Yohan Alamo MD   Patient/Family Goals Statement go home   Pertinent History of Current Problem (include personal factors and/or comorbidities that impact the POC) Pt is a 63 yr old female admitted for revision of L TKA, POD 0.   Precautions/Limitations fall precautions   Weight-Bearing Status - LLE weight-bearing as tolerated   Weight-Bearing Status - RLE full weight-bearing   General Observations Pleasant and cooperative.   General Info Comments Activity: up with  assist, ambulate with assist.    Cognitive Status Examination   Orientation orientation to person, place and time   Level of Consciousness alert   Follows Commands and Answers Questions 100% of the time   Personal Safety and Judgment intact   Memory intact   Pain Assessment   Patient Currently in Pain Yes, see Vital Sign flowsheet  (4/10 pain before and after PT .)   Range of Motion (ROM)   ROM Comment See daily doc for L knee, otherwise WFL   Strength   Strength Comments L knee: NT, otherwise: 4+/5   Bed Mobility   Bed Mobility Comments supine<>sit: SBA   Transfer Skills   Transfer Comments STS at CGA   Gait   Gait Comments 20 ft using RW and CGA for safety.   Balance   Balance Comments sitting; good   Sensory Examination   Sensory Perception no deficits were identified   Coordination   Coordination no deficits were identified   Muscle Tone   Muscle Tone no deficits were identified   Modality Interventions   Planned Modality Interventions Cryotherapy   General Therapy Interventions   Planned Therapy Interventions balance training;bed mobility training;gait training;ROM;strengthening;stretching;transfer training;risk factor education;home program guidelines;progressive activity/exercise   Clinical Impression   Criteria for Skilled Therapeutic Intervention yes, treatment indicated   PT Diagnosis impaired gait   Influenced by the following impairments decreased ROM, decreased strength and activity tolerance   Functional limitations due to impairments assistance needed with mobility   Clinical Presentation Stable/Uncomplicated   Clinical Presentation Rationale current fucntional presentation'   Clinical Decision Making (Complexity) Low complexity   Therapy Frequency` 2 times/day   Predicted Duration of Therapy Intervention (days/wks) 3   Anticipated Discharge Disposition Home with Assist;Home with Outpatient Therapy   Risk & Benefits of therapy have been explained Yes   Patient, Family & other staff in agreement with  "plan of care Yes   Clinical Impression Comments Pt presents with decreased ROM, decreased strength and activity tolerance limiting fucntional independence. Pt will benefit from continued skilled PT to achieve PLOF and independence.    [facility] AM-PAC  \"6 Clicks\" V.2 Basic Mobility Inpatient Short Form   1. Turning from your back to your side while in a flat bed without using bedrails? 3 - A Little   2. Moving from lying on your back to sitting on the side of a flat bed without using bedrails? 3 - A Little   3. Moving to and from a bed to a chair (including a wheelchair)? 3 - A Little   4. Standing up from a chair using your arms (e.g., wheelchair, or bedside chair)? 3 - A Little   5. To walk in hospital room? 3 - A Little   6. Climbing 3-5 steps with a railing? 3 - A Little   Basic Mobility Raw Score (Score out of 24.Lower scores equate to lower levels of function) 18   Total Evaluation Time   Total Evaluation Time (Minutes) 15     "

## 2019-03-22 NOTE — ANESTHESIA PREPROCEDURE EVALUATION
Anesthesia Pre-Procedure Evaluation    Patient: Niecy Toro   MRN: [id number] : 1955          Preoperative Diagnosis: FAILED LEFT TOTAL KNEE    Procedure(s):  LEFT TOTAL KNEE REVISION ARTHROPLASTY(OUT: DEPUY, IN: DEPUY)^    Past Medical History:   Diagnosis Date     Anxiety      Depression      Hyperlipidemia      Hypothyroidism      Insomnia      Osteopenia      Sleep apnea      Past Surgical History:   Procedure Laterality Date     ORTHOPEDIC SURGERY  2017    left total knee replacement     ORTHOPEDIC SURGERY      left wrist repair       Anesthesia Evaluation     . Pt has had prior anesthetic.     No history of anesthetic complications          ROS/MED HX    ENT/Pulmonary:     (+)sleep apnea, uses CPAP , . .    Neurologic:       Cardiovascular:     (+) Dyslipidemia, ----. : . . . :. .       METS/Exercise Tolerance:     Hematologic:         Musculoskeletal:   (+) arthritis, , , other musculoskeletal- prior TKA on left knee that didn't take properly      GI/Hepatic:        (-) GERD   Renal/Genitourinary:         Endo:     (+) thyroid problem hypothyroidism, .      Psychiatric:     (+) psychiatric history anxiety and depression      Infectious Disease:         Malignancy:         Other:                          Physical Exam  Normal systems: cardiovascular, pulmonary and dental    Airway   Mallampati: III  TM distance: >3 FB  Neck ROM: full    Dental     Cardiovascular       Pulmonary             Lab Results   Component Value Date    WBC 4.6 2018    HGB 12.7 2019    HCT 38.2 2018     2018    CRP <2.9 2018    SED 10 2018     2015    POTASSIUM 2.9 (L) 2015    CHLORIDE 101 2015    CO2 28 2015    BUN 15 2015    CR 0.84 2019     (H) 2015    BART 9.3 2015    MAG 1.9 2015       Preop Vitals  BP Readings from Last 3 Encounters:   19 119/76   09/14/15 124/76    Pulse Readings from Last 3  "Encounters:   03/22/19 (P) 62   09/14/15 73      Resp Readings from Last 3 Encounters:   03/22/19 (P) 14   09/14/15 18    SpO2 Readings from Last 3 Encounters:   03/22/19 (P) 98%   09/14/15 100%      Temp Readings from Last 1 Encounters:   03/22/19 36.3  C (97.4  F) (Temporal)    Ht Readings from Last 1 Encounters:   03/22/19 1.575 m (5' 2\")      Wt Readings from Last 1 Encounters:   03/22/19 60.3 kg (133 lb)    Estimated body mass index is 24.33 kg/m  as calculated from the following:    Height as of this encounter: 1.575 m (5' 2\").    Weight as of this encounter: 60.3 kg (133 lb).       Anesthesia Plan      History & Physical Review  History and physical reviewed and following examination; no interval change.    ASA Status:  2 .    NPO Status:  > 8 hours    Plan for General, ETT and Periph. Nerve Block for postop pain with Intravenous induction. Maintenance will be Balanced.    PONV prophylaxis:  Ondansetron (or other 5HT-3) and Dexamethasone or Solumedrol  Additional equipment: Videolaryngoscope Pt elected for a GA as it is a redo, and she did not know what she had last time  Propofol gtt as primary anesthetic, nitrous if needed  Zofran 4mg, decadron 8mg      Postoperative Care  Postoperative pain management:  IV analgesics, Multi-modal analgesia and Peripheral nerve block (Single Shot).      Consents  Anesthetic plan, risks, benefits and alternatives discussed with:  Patient..                 Jaime Gomez MD  "

## 2019-03-22 NOTE — ANESTHESIA CARE TRANSFER NOTE
Patient: Niecy Toro    Procedure(s):  LEFT TOTAL KNEE REVISION ARTHROPLASTY    Diagnosis: FAILED LEFT TOTAL KNEE  Diagnosis Additional Information: No value filed.    Anesthesia Type:   General, ETT, Periph. Nerve Block for postop pain     Note:  Airway :Face Mask  Patient transferred to:PACU  Handoff Report: Identifed the Patient, Identified the Reponsible Provider, Reviewed the pertinent medical history, Discussed the surgical course, Reviewed Intra-OP anesthesia mangement and issues during anesthesia, Set expectations for post-procedure period and Allowed opportunity for questions and acknowledgement of understanding      Vitals: (Last set prior to Anesthesia Care Transfer)    CRNA VITALS  3/22/2019 1022 - 3/22/2019 1059      3/22/2019             Pulse:  67    SpO2:  93 %    Resp Rate (observed):   8                Electronically Signed By: OLIVIA Lopez CRNA  March 22, 2019  10:59 AM

## 2019-03-22 NOTE — ANESTHESIA PROCEDURE NOTES
Peripheral nerve/Neuraxial procedure note : Femoral (adductor canal approach)  Pre-Procedure  Performed by Jaime Gomez MD  Location: pre-op      Pre-Anesthestic Checklist: patient identified, IV checked, risks and benefits discussed, informed consent, pre-op evaluation, at physician/surgeon's request and post-op pain management    Timeout  Correct Patient: Yes   Correct Procedure: Yes   Correct Site: Yes   Correct Laterality: Yes   Correct Position: Yes   Site Marked: Yes   .   Procedure Documentation    .    Procedure:  left  Femoral (adductor canal approach).  Local skin infiltrated with mL of 1% lidocaine.     Ultrasound used to identify targeted nerve, plexus, or vascular marker and placed a needle adjacent to it., Ultrasound was used to visualize the spread of the anesthetic in close proximity to the above stated nerve. A permanent image is entered into the patient's record.  Patient Prep;chlorhexidine gluconate and isopropyl alcohol.  .  Needle: short bevel Needle Gauge: 21.    Needle Length (Inches) 3.13  Insertion Method: Single Shot.       Assessment/Narrative  Paresthesias: No.  Injection made incrementally with aspirations every 5 mL..  The placement was negative for: blood aspirated, painful injection and site bleeding.  Bolus given via needle..   Secured via.   Complications: none. Comments:  Sterile.  Ultrasound image saved. Ultrasound used to see nerve, needle adjacent, and local deposited around nerves, 20cc 0.5% ropivacaine + 1:400k epi

## 2019-03-23 ENCOUNTER — APPOINTMENT (OUTPATIENT)
Dept: OCCUPATIONAL THERAPY | Facility: CLINIC | Age: 64
End: 2019-03-23
Attending: ORTHOPAEDIC SURGERY
Payer: COMMERCIAL

## 2019-03-23 ENCOUNTER — APPOINTMENT (OUTPATIENT)
Dept: PHYSICAL THERAPY | Facility: CLINIC | Age: 64
End: 2019-03-23
Attending: ORTHOPAEDIC SURGERY
Payer: COMMERCIAL

## 2019-03-23 LAB
GLUCOSE BLDC GLUCOMTR-MCNC: 124 MG/DL (ref 70–99)
HGB BLD-MCNC: 11.2 G/DL (ref 11.7–15.7)

## 2019-03-23 PROCEDURE — 36415 COLL VENOUS BLD VENIPUNCTURE: CPT | Performed by: ORTHOPAEDIC SURGERY

## 2019-03-23 PROCEDURE — 40000275 ZZH STATISTIC RCP TIME EA 10 MIN

## 2019-03-23 PROCEDURE — 97535 SELF CARE MNGMENT TRAINING: CPT | Mod: GO | Performed by: OCCUPATIONAL THERAPIST

## 2019-03-23 PROCEDURE — 97166 OT EVAL MOD COMPLEX 45 MIN: CPT | Mod: GO | Performed by: OCCUPATIONAL THERAPIST

## 2019-03-23 PROCEDURE — 82962 GLUCOSE BLOOD TEST: CPT

## 2019-03-23 PROCEDURE — 97110 THERAPEUTIC EXERCISES: CPT | Mod: GP

## 2019-03-23 PROCEDURE — 85018 HEMOGLOBIN: CPT | Performed by: ORTHOPAEDIC SURGERY

## 2019-03-23 PROCEDURE — 25000128 H RX IP 250 OP 636: Performed by: ORTHOPAEDIC SURGERY

## 2019-03-23 PROCEDURE — 94660 CPAP INITIATION&MGMT: CPT

## 2019-03-23 PROCEDURE — 25000132 ZZH RX MED GY IP 250 OP 250 PS 637: Performed by: ORTHOPAEDIC SURGERY

## 2019-03-23 PROCEDURE — 25800030 ZZH RX IP 258 OP 636: Performed by: ORTHOPAEDIC SURGERY

## 2019-03-23 PROCEDURE — 25000132 ZZH RX MED GY IP 250 OP 250 PS 637: Performed by: PHYSICIAN ASSISTANT

## 2019-03-23 PROCEDURE — 97116 GAIT TRAINING THERAPY: CPT | Mod: GP

## 2019-03-23 RX ORDER — OXYCODONE HYDROCHLORIDE 5 MG/1
TABLET ORAL
Qty: 40 TABLET | Refills: 0 | Status: SHIPPED | OUTPATIENT
Start: 2019-03-23 | End: 2023-12-27

## 2019-03-23 RX ORDER — ACETAMINOPHEN 500 MG
1000 TABLET ORAL EVERY 6 HOURS PRN
Qty: 100 TABLET | Refills: 0 | Status: ON HOLD | OUTPATIENT
Start: 2019-03-23 | End: 2023-12-29

## 2019-03-23 RX ORDER — AMOXICILLIN 250 MG
2 CAPSULE ORAL 2 TIMES DAILY PRN
Qty: 60 TABLET | Refills: 0 | Status: SHIPPED | OUTPATIENT
Start: 2019-03-23 | End: 2023-12-27

## 2019-03-23 RX ORDER — KETOROLAC TROMETHAMINE 10 MG/1
10 TABLET, FILM COATED ORAL EVERY 6 HOURS
Qty: 6 TABLET | Refills: 0 | Status: SHIPPED | OUTPATIENT
Start: 2019-03-23 | End: 2019-03-25

## 2019-03-23 RX ADMIN — OXYCODONE HYDROCHLORIDE 5 MG: 5 TABLET ORAL at 11:39

## 2019-03-23 RX ADMIN — KETOROLAC TROMETHAMINE 30 MG: 30 INJECTION, SOLUTION INTRAMUSCULAR; INTRAVENOUS at 06:31

## 2019-03-23 RX ADMIN — OXYCODONE HYDROCHLORIDE 5 MG: 5 TABLET ORAL at 23:54

## 2019-03-23 RX ADMIN — POTASSIUM CHLORIDE, DEXTROSE MONOHYDRATE AND SODIUM CHLORIDE: 150; 5; 450 INJECTION, SOLUTION INTRAVENOUS at 06:33

## 2019-03-23 RX ADMIN — OXYCODONE HYDROCHLORIDE 5 MG: 5 TABLET ORAL at 20:45

## 2019-03-23 RX ADMIN — CEFAZOLIN 1 G: 1 INJECTION, POWDER, FOR SOLUTION INTRAMUSCULAR; INTRAVENOUS at 03:20

## 2019-03-23 RX ADMIN — SENNOSIDES AND DOCUSATE SODIUM 1 TABLET: 8.6; 5 TABLET ORAL at 08:21

## 2019-03-23 RX ADMIN — ACETAMINOPHEN 975 MG: 325 TABLET, FILM COATED ORAL at 03:20

## 2019-03-23 RX ADMIN — OXYCODONE HYDROCHLORIDE 5 MG: 5 TABLET ORAL at 08:21

## 2019-03-23 RX ADMIN — SIMVASTATIN 20 MG: 20 TABLET, FILM COATED ORAL at 20:52

## 2019-03-23 RX ADMIN — ACETAMINOPHEN 975 MG: 325 TABLET, FILM COATED ORAL at 17:33

## 2019-03-23 RX ADMIN — KETOROLAC TROMETHAMINE 30 MG: 30 INJECTION, SOLUTION INTRAMUSCULAR; INTRAVENOUS at 12:10

## 2019-03-23 RX ADMIN — KETOROLAC TROMETHAMINE 30 MG: 30 INJECTION, SOLUTION INTRAMUSCULAR; INTRAVENOUS at 01:04

## 2019-03-23 RX ADMIN — OXYCODONE HYDROCHLORIDE 5 MG: 5 TABLET ORAL at 14:39

## 2019-03-23 RX ADMIN — ASPIRIN 325 MG: 325 TABLET, DELAYED RELEASE ORAL at 08:21

## 2019-03-23 RX ADMIN — SENNOSIDES AND DOCUSATE SODIUM 1 TABLET: 8.6; 5 TABLET ORAL at 20:46

## 2019-03-23 RX ADMIN — VENLAFAXINE HYDROCHLORIDE 225 MG: 75 CAPSULE, EXTENDED RELEASE ORAL at 08:21

## 2019-03-23 RX ADMIN — LEVOTHYROXINE SODIUM 150 MCG: 150 TABLET ORAL at 06:31

## 2019-03-23 RX ADMIN — OXYCODONE HYDROCHLORIDE 5 MG: 5 TABLET ORAL at 17:33

## 2019-03-23 RX ADMIN — ACETAMINOPHEN 975 MG: 325 TABLET, FILM COATED ORAL at 10:24

## 2019-03-23 RX ADMIN — TRAZODONE HYDROCHLORIDE 100 MG: 100 TABLET ORAL at 20:52

## 2019-03-23 ASSESSMENT — ACTIVITIES OF DAILY LIVING (ADL)
PREVIOUS_RESPONSIBILITIES: MEAL PREP;HOUSEKEEPING;LAUNDRY;SHOPPING
IADL_COMMENTS: FAMILY CAN ASSIST AS NEEDED

## 2019-03-23 NOTE — PLAN OF CARE
Pt progressing per plan of care. Pain managed with 1 tab of oxycodone. Up with SBA and is voiding adequately in BR. Discharging home tomorrow.

## 2019-03-23 NOTE — PROGRESS NOTES
03/23/19 1400   Quick Adds   Type of Visit Initial Occupational Therapy Evaluation   Living Environment   Lives With spouse   Living Arrangements house   Home Accessibility stairs to enter home;stairs within home   Number of Stairs, Main Entrance 2   Stair Railings, Main Entrance none   Number of Stairs, Within Home, Primary other (see comments)  (12)   Transportation Anticipated family or friend will provide   Living Environment Comment Pt lives in a 2 story house, with bed/bath upstairs   Self-Care   Usual Activity Tolerance good   Current Activity Tolerance moderate   Regular Exercise No   Equipment Currently Used at Home none   Activity/Exercise/Self-Care Comment Pt could walk a mile a month ago   Functional Level   Ambulation 0-->independent   Transferring 0-->independent   Toileting 0-->independent   Bathing 0-->independent   Dressing 0-->independent   Eating 0-->independent   Communication 0-->understands/communicates without difficulty   Swallowing 0-->swallows foods/liquids without difficulty   Cognition 0 - no cognition issues reported   Fall history within last six months no   Which of the above functional risks had a recent onset or change? none   Prior Functional Level Comment Pt reports being indep with all ADLs at baseline   General Information   Onset of Illness/Injury or Date of Surgery - Date 03/22/19   Referring Physician Yohan Vences   Patient/Family Goals Statement home   Additional Occupational Profile Info/Pertinent History of Current Problem pt is s/p TKA   Precautions/Limitations fall precautions   Weight-Bearing Status - LUE full weight-bearing   Weight-Bearing Status - RUE full weight-bearing   Weight-Bearing Status - LLE weight-bearing as tolerated   Weight-Bearing Status - RLE full weight-bearing   General Observations pt seated in bed upon arrival, family present; pt agreeable to OT session   General Info Comments activity order: ambulate with assist 3x daily   Cognitive Status  Examination   Cognitive Comment no cognitive concerns   Sensory Examination   Sensory Quick Adds No deficits were identified   Pain Assessment   Patient Currently in Pain Yes, see Vital Sign flowsheet   Integumentary/Edema   Integumentary/Edema Comments post-op bandaging on surgical site   Posture   Posture forward head position;protracted shoulders   Range of Motion (ROM)   ROM Comment BUE WFL   Strength   Strength Comments pt demostrated functional BUE strength during bed mobility, transfers   Mobility   Bed Mobility Bed mobility skill: Scooting/Bridging;Bed mobility skill: Sit to supine;Bed mobility skill: Supine to sit   Bed Mobility Skill: Scooting/Bridging   Level of [place]: Scooting/Bridging independent   Bed Mobility Skill: Sit to Supine   Level of [place]: Sit/Supine contact guard   Physical Assist/Nonphysical Assist: Sit/Supine set-up required;supervision;verbal cues   Bed Mobility Skill: Supine to Sit   Level of [place]: Supine/Sit contact guard   Physical Assist/Nonphysical Assist: Supine/Sit set-up required;supervision;verbal cues   Transfer Skill: Sit to Stand   Level of [place]: Sit/Stand contact guard   Physical Assist/Nonphysical Assist: Sit/Stand supervision   Transfer Skill: Sit to Stand weight-bearing as tolerated   Assistive Device for Transfer: Sit/Stand standard walker   Transfer Skill: Toilet Transfer   Level of [place]: Toilet contact guard   Physical Assist/Nonphysical Assist: Toilet verbal cues   Weight-Bearing Restrictions: Toilet weight-bearing as tolerated   Assistive Device standard walker   Tub/Shower Transfer   Tub/Shower Transfer Comments pt has walk-in shower at home   Balance   Balance Comments good seated; good ambulating in room with 2WW   Lower Body Dressing   Level of [place]: Dress Lower Body stand-by assist   Physical Assist/Nonphysical Assist: Dress Lower Body verbal cues;supervision;set-up required   Toileting   Level of [place]: Toilet  "independent   Instrumental Activities of Daily Living (IADL)   Previous Responsibilities meal prep;housekeeping;laundry;shopping   IADL Comments family can assist as needed   Activities of Daily Living Analysis   Impairments Contributing to Impaired Activities of Daily Living pain;post surgical precautions;strength decreased   General Therapy Interventions   Planned Therapy Interventions ADL retraining;transfer training   Clinical Impression   Criteria for Skilled Therapeutic Interventions Met yes, treatment indicated   OT Diagnosis impaired ADLs   Influenced by the following impairments pain, precautions, weakness, decreased knee AROM   Assessment of Occupational Performance 3-5 Performance Deficits   Identified Performance Deficits dressing, toileting, bathing, home chores   Clinical Decision Making (Complexity) Moderate complexity   Therapy Frequency daily   Predicted Duration of Therapy Intervention (days/wks) 1 day   Anticipated Discharge Disposition Home with Assist   Risks and Benefits of Treatment have been explained. Yes   Patient, Family & other staff in agreement with plan of care Yes   [facility] AM-PAC  \"6 Clicks\" Daily Activity Inpatient Short Form   1. Putting on and taking off regular lower body clothing? 3 - A Little   2. Bathing (including washing, rinsing, drying)? 4 - None   3. Toileting, which includes using toilet, bedpan or urinal? 4 - None   4. Putting on and taking off regular upper body clothing? 4 - None   5. Taking care of personal grooming such as brushing teeth? 4 - None   6. Eating meals? 4 - None   Daily Activity Raw Score (Score out of 24.Lower scores equate to lower levels of function) 23   Total Evaluation Time   Total Evaluation Time (Minutes) 8     "

## 2019-03-23 NOTE — PLAN OF CARE
Alert and oriented x 4. Dressing cdi, cms intact. Javier with adequate output. Up with assist of 1.

## 2019-03-23 NOTE — PLAN OF CARE
Discharge Planner PT   Patient plan for discharge: Home with spouse  Current status: Pt was received supine in bed and c/o 5/10 pain in the L knee. Pt received pain meds before PT session began. Pt is at independent level for all aspects of bed mobility, STS at supervision, ambulated 500 ft using RW and supervision. Pt went up/down 1 flight of stairs using 1 rail support and SBA. Pt went up/down 3 steps without rail support and CGA for safety. Pt was left sitting in a WC with chair alarm on and all needs within reach.   Barriers to return to prior living situation: None anticipated.  Recommendations for discharge: Home with family support and OP PT.   Rationale for recommendations: Pt will benefit from OP PT to achieve increased ROM and strength. Pt will need family support with household chores due to decreased activity tolerance and assistance with stair management for safety.        Entered by: Edie Sibley 03/23/2019 12:14 PM

## 2019-03-23 NOTE — PLAN OF CARE
A&O, VSS on RA, pain well controlled on scheduled medications. IVF infusing. Javier catheter removed and Pt is due to void. L knee dressing remains CDI. No acute changes during shift. PT and OT consulted.

## 2019-03-23 NOTE — PLAN OF CARE
Discharge Planner OT   Patient plan for discharge: home  Current status: OT order received, evaluation completed.  Patient is s/p L TKA.  She lives with her  in a house with bed/bath on upper level.  She reports being independent with all ADLs at baseline.    Patient educated in techniques for LE dressing, toilet transfer, shower transfer, and patient completed all with SBA/CGA. Educated in walker safety, patient verbalized understanding.  Spouse states he can assist with TEDS and home chores such as laundry.  Barriers to return to prior living situation: no OT barriers  Recommendations for discharge: home with spouse to assist with home chores  Rationale for recommendations: patient progressing well, anticipate she will be able to discharge to home with family to assist.       Entered by: PAULINA SPANN 03/23/2019 4:28 PM        Occupational Therapy Discharge Summary    Reason for therapy discharge:    All goals and outcomes met, no further needs identified.    Progress towards therapy goal(s). See goals on Care Plan in [facility] electronic health record for goal details.  Goals met    Therapy recommendation(s):    No further therapy is recommended.

## 2019-03-24 ENCOUNTER — APPOINTMENT (OUTPATIENT)
Dept: PHYSICAL THERAPY | Facility: CLINIC | Age: 64
End: 2019-03-24
Attending: ORTHOPAEDIC SURGERY
Payer: COMMERCIAL

## 2019-03-24 VITALS
RESPIRATION RATE: 16 BRPM | BODY MASS INDEX: 24.48 KG/M2 | OXYGEN SATURATION: 94 % | SYSTOLIC BLOOD PRESSURE: 129 MMHG | HEIGHT: 62 IN | TEMPERATURE: 97.9 F | DIASTOLIC BLOOD PRESSURE: 73 MMHG | WEIGHT: 133 LBS | HEART RATE: 57 BPM

## 2019-03-24 LAB
BACTERIA SPEC CULT: NO GROWTH
GLUCOSE SERPL-MCNC: 86 MG/DL (ref 70–99)
HGB BLD-MCNC: 11.6 G/DL (ref 11.7–15.7)
Lab: NORMAL
SPECIMEN SOURCE: NORMAL

## 2019-03-24 PROCEDURE — 36415 COLL VENOUS BLD VENIPUNCTURE: CPT | Performed by: ORTHOPAEDIC SURGERY

## 2019-03-24 PROCEDURE — 97110 THERAPEUTIC EXERCISES: CPT | Mod: GP

## 2019-03-24 PROCEDURE — 12000000 ZZH R&B MED SURG/OB

## 2019-03-24 PROCEDURE — 25000132 ZZH RX MED GY IP 250 OP 250 PS 637: Performed by: ORTHOPAEDIC SURGERY

## 2019-03-24 PROCEDURE — 85018 HEMOGLOBIN: CPT | Performed by: ORTHOPAEDIC SURGERY

## 2019-03-24 PROCEDURE — 40000275 ZZH STATISTIC RCP TIME EA 10 MIN

## 2019-03-24 PROCEDURE — 82947 ASSAY GLUCOSE BLOOD QUANT: CPT | Performed by: ORTHOPAEDIC SURGERY

## 2019-03-24 PROCEDURE — 97116 GAIT TRAINING THERAPY: CPT | Mod: GP

## 2019-03-24 PROCEDURE — 94660 CPAP INITIATION&MGMT: CPT

## 2019-03-24 PROCEDURE — 25000132 ZZH RX MED GY IP 250 OP 250 PS 637: Performed by: PHYSICIAN ASSISTANT

## 2019-03-24 RX ADMIN — ACETAMINOPHEN 975 MG: 325 TABLET, FILM COATED ORAL at 03:08

## 2019-03-24 RX ADMIN — ACETAMINOPHEN 975 MG: 325 TABLET, FILM COATED ORAL at 10:09

## 2019-03-24 RX ADMIN — ASPIRIN 325 MG: 325 TABLET, DELAYED RELEASE ORAL at 09:05

## 2019-03-24 RX ADMIN — LEVOTHYROXINE SODIUM 150 MCG: 150 TABLET ORAL at 06:45

## 2019-03-24 RX ADMIN — OXYCODONE HYDROCHLORIDE 5 MG: 5 TABLET ORAL at 10:09

## 2019-03-24 RX ADMIN — OXYCODONE HYDROCHLORIDE 5 MG: 5 TABLET ORAL at 03:08

## 2019-03-24 RX ADMIN — OXYCODONE HYDROCHLORIDE 5 MG: 5 TABLET ORAL at 06:45

## 2019-03-24 RX ADMIN — SENNOSIDES AND DOCUSATE SODIUM 2 TABLET: 8.6; 5 TABLET ORAL at 09:05

## 2019-03-24 RX ADMIN — VENLAFAXINE HYDROCHLORIDE 225 MG: 75 CAPSULE, EXTENDED RELEASE ORAL at 09:05

## 2019-03-24 NOTE — OP NOTE
Procedure Date: 03/22/2019      PREOPERATIVE DIAGNOSIS:  Failed (loose) tibial component, left total knee arthroplasty, DePuy Attune components.      POSTOPERATIVE DIAGNOSIS:  Failed (loose) tibial component, left total knee arthroplasty, DePuy Attune components.      PROCEDURE:  Revision left knee arthroplasty with revision of tibial component.  Tourniquetless technique      SURGEON:  Yohan Alamo MD      ASSISTANT:  Parisa Taylor PA-C     PROCEDURE IN DETAIL:  The patient was brought to the operating room.  After satisfactory anesthesia, weight-based antibiotic dosing was given and the patient received 1 gram of tranexamic acid preoperatively and at closure.  The leg was then prepped and draped in the usual sterile fashion.  Tourniquet was not utilized during the procedure.  Straight anterior incision was made.  Dissection was carried down to the extensor mechanism.  The extensor mechanism was exposed and medial arthrotomy was performed.  Careful exposure was made of the femoral and tibial components.  The femoral component and the patellar component were examined and there was no evidence of loosening of the components.  The tibial component was examined and the tibial component was grossly loose.  It was removed without any difficulty.  With removal of the component, it was noted that there was absolutely no cement on the component itself.  The cement, however,  was fully bonded to the tibial bone.  As there was sufficient exposure, and with no loosening of the femoral or patellar components, there was sufficient exposure of the tibial component to proceed with an isolated tibial component revision.  Tibia was carefully exposed and the cement was carefully removed from the bone/cement interface.  This resulted in very little bone loss.  Based upon this, it was elected to proceed with a revision of the tibial component using a cemented revision tray.  The intramedullary drill was placed and sequential reamers  were prepared for the tibial component.  Revision tray had a 60 mm extension and it was felt that this was sufficiently bypassed initial tibial component.  The tibial cut was freshened by about 2-3 mm using the intramedullary guide.  Trial reduction was performed.  The smallest revision tray possible is a size 3.  This did leave a small amount of medial overhang, which was unavoidable given the patient's very small size.  The trial reduction was performed with a 10 mm rotating platform insert.  There was excellent soft tissue balancing, patellar tracking, alignment, as well as motion.  Tibial baseplate was then prepared completely.  Gonzalez's bone plug placed.  Tibial component was cemented in place with antibiotic cement.  It should be noted that cultures were sent and Gram stain was negative with no sign of significant PMNs.  Two different pathologic specimens were sent, one showing absolutely no acute inflammation and one showing small area of focal acute inflammation.  It should be noted that clinically the patient did not appear to have an infected knee arthroplasty and laboratory tests preoperatively also were not consistent with an infection.  After the tibial component was cemented in place, excess cement was removed.  The  10 mm rotating platform polyethylene insert was impacted in place and again the patient had excellent motion, patellar tracking, alignment, as well as stability in all planes including medial laterally as well as anteroposteriorly.  The knee was thoroughly irrigated.  A 3-minute dilute Betadine soak had been performed.  Periarticular injection was performed.  The wound was then closed in sequential layers including interrupted 0 Vicryl and extensor mechanism and then oversewn with a running 0 Stratafix suture and after 2-0 Vicryl and 2-0 running static suture was utilized followed by 3-0 Monocryl and Dermabond Prineo dressing.  Hemostasis obtained prior to closure.  A gram of vancomycin  powder was placed as well in the wound deep and superficial and the patient left the operating room in satisfactory condition.         DOLORES ALAMO MD             D: 2019   T: 2019   MT:       Name:     SANJANA LARA   MRN:      [id number]        Account:        [id number]   :      1955           Procedure Date: 2019      Document: [id number]       cc: Dolores Alamo MD

## 2019-03-24 NOTE — PROGRESS NOTES
"Orthopedic Surgery  3/23/2019  POD #1    S: Patient voices no complaints today. Moderate pain.    O: Blood pressure 138/75, pulse 57, temperature 98.4  F (36.9  C), temperature source Oral, resp. rate 16, height 1.575 m (5' 2\"), weight 60.3 kg (133 lb), SpO2 96 %.  Lab Results   Component Value Date    HGB 11.6 03/24/2019     Neurovascularly intact.  Calves are negative bilaterally, both soft and nontender.  The dressing is C/D/I.      A: Ms. Toro is doing well status post Procedure(s):  LEFT TOTAL KNEE REVISION ARTHROPLASTY.    P: Dressing change prior to d/c, use ABD and tape dressing.  No aquacel or island dressing.  Continue physical therapy. Anticipate discharge to home on Sunday.   All orders written.    Yohan Grossman  [phone]    "

## 2019-03-24 NOTE — PLAN OF CARE
Pt progress per plan of care. Up w/ SBA to BR. Taking oxycodone for pain. Dressing CDI. Plan to discharge today.

## 2019-03-25 LAB — COPATH REPORT: NORMAL

## 2019-03-25 NOTE — PLAN OF CARE
Pain managed with Oxycodone, up with SBA /walker, voiding in BR, VSS on RA, CMS intact, discharged home with .

## 2019-03-26 ENCOUNTER — DOCUMENTATION ONLY (OUTPATIENT)
Dept: OTHER | Facility: CLINIC | Age: 64
End: 2019-03-26

## 2019-03-26 NOTE — DISCHARGE SUMMARY
"Discharge Summary    Niecy Toro MRN# [id number]   YOB: 1955 Age: 63 year old     Date of Admission: 3/22/2019    Date of Discharge: 3/24/2019    Reason for Admission: Niecy Toro is an 63 year old female who was admitted to the hospital following surgery.    Preoperative Diagnosis: FAILED LEFT TOTAL KNEE    Postoperative Diagnosis: FAILED LEFT TOTAL KNEE    Procedure Completed:  Revision left total knee arthroplasty    Hospital Course:  Ms. oTro was admitted and underwent the above procedure. The patient tolerated the procedure well. There were no complications. Following surgery she was admitted to the floor.  During her stay she did not require any blood transfusions. Her pain was controlled with oral pain medication.  During her stay she progressed well in physical therapy and all the therapy goals were met.     Discharge Physical Exam:  /73 (BP Location: Left arm)   Pulse 57   Temp 97.9  F (36.6  C) (Oral)   Resp 16   Ht 1.575 m (5' 2\")   Wt 60.3 kg (133 lb)   SpO2 94%   BMI 24.33 kg/m    Neurovascularly intact, distal pulses present bilaterally.  Calves are negative bilaterally, both soft and nontender.    Assessment: Ms. Toro is stable and doing well status post revision left total knee arthroplasty.    Plan: We will discharge her home on analgesics and deep venous thrombosis prophylaxis.  She will follow-up with me approximately 2 weeks from surgery.  She may call [phone] to schedule an appointment.    Meds:   Niecy Toro   Home Medication Instructions SATHYA:[id number]    Printed on:03/26/19 1026   Medication Information                      acetaminophen (TYLENOL) 500 MG tablet  Take 2 tablets (1,000 mg) by mouth every 6 hours as needed for pain             aspirin (ASA) 325 MG EC tablet  Take 1 tablet (325 mg) by mouth daily             levothyroxine (SYNTHROID/LEVOTHROID) 150 MCG tablet  Take 150 mcg by mouth every morning (before breakfast)             oxyCODONE " (ROXICODONE) 5 MG tablet  Take 1 tablet every 4-6 hours for pain rating 3-6,  Take 2 tablets every 4-6 hours for pain 7-10    **Max 6 tablets per day**             senna-docusate (SENOKOT-S/PERICOLACE) 8.6-50 MG tablet  Take 2 tablets by mouth 2 times daily as needed for constipation             simvastatin (ZOCOR) 20 MG tablet  Take 20 mg by mouth At Bedtime              traZODone (DESYREL) 100 MG tablet  Take 100 mg by mouth At Bedtime             venlafaxine (EFFEXOR-ER) 150 MG 24 hr tablet  Take 150 mg by mouth daily (Takes with 75 mg tablet = 225 mg dose)             venlafaxine (EFFEXOR-ER) 75 MG 24 hr tablet  Take 75 mg by mouth daily (Takes with 150 mg tablet = 225 mg dose)

## 2019-03-29 LAB
BACTERIA SPEC CULT: NORMAL
Lab: NORMAL
SPECIMEN SOURCE: NORMAL

## 2020-04-13 NOTE — PLAN OF CARE
Discharge Planner PT   Patient plan for discharge: Home with spouse  Current status: Patient independent with bed mobility. Sit <> stand transfer completed with Mod I. Ambulated 10 ft x 1 and 60 ft x 1 with Mod I using FWW; step through, reciprocal pattern. Went up/down 10 stairs x 1 with R railing, SEC and CGA for safety; tolerated well. Tolerated all exercises well.   Barriers to return to prior living situation: None   Recommendations for discharge: Home with family support and OP PT per plan established by the PT.   Rationale for recommendations: Pt will benefit from OP PT to achieve increased ROM and strength. Pt will need family support with household chores due to decreased activity tolerance and assistance with stair management for safety.   Entered by: Traci Solis 03/24/2019 11:05 AM        Patient discharged to home. PT goals met.   Subjective   Michelle Proctor is a 55 y.o. female who presents to the clinic with:        Urinary Tract Infection    This is a new problem. The current episode started 1 to 4 weeks ago (approx 10 days ago). The problem occurs intermittently. The problem has been unchanged. The quality of the pain is described as aching. The patient is experiencing no pain. There has been no fever. There is no history of pyelonephritis. Associated symptoms include frequency. Pertinent negatives include no chills, discharge, flank pain, hematuria, nausea or urgency. Treatments tried: cranberry pills, up to 8 per day. UTI in Sept          The following portions of the patient's history were reviewed and updated as appropriate: allergies, current medications, past family history, past medical history, past social history, past surgical history and problem list.        Review of Systems   Constitutional: Negative for activity change, chills, fatigue and fever.   Gastrointestinal: Negative for nausea.   Genitourinary: Positive for dysuria and frequency. Negative for flank pain, hematuria, pelvic pain, urgency and vaginal discharge.         Objective   Physical Exam   Constitutional: She is oriented to person, place, and time. She appears well-developed and well-nourished.   HENT:   Head: Normocephalic.   Eyes: Pupils are equal, round, and reactive to light. EOM are normal.   Pulmonary/Chest: Effort normal.   Neurological: She is alert and oriented to person, place, and time.   Psychiatric:   Flat affect         Assessment/Plan   Michelle was seen today for urinary tract infection.    Diagnoses and all orders for this visit:    Acute cystitis without hematuria    Acute UTI (urinary tract infection)  -     Discontinue: nitrofurantoin, macrocrystal-monohydrate, (Macrobid) 100 MG capsule; Take 1 capsule by mouth 2 (Two) Times a Day.  -     nitrofurantoin, macrocrystal-monohydrate, (Macrobid) 100 MG capsule; Take 1 capsule by mouth 2  (Two) Times a Day.      Increase fluids  See PCP if reoccur, eval for recurrent UTIs

## 2020-06-12 ENCOUNTER — HOSPITAL ENCOUNTER (OUTPATIENT)
Dept: LAB | Age: 65
Setting detail: SPECIMEN
Discharge: HOME | End: 2020-06-12

## 2020-09-03 ENCOUNTER — APPOINTMENT (OUTPATIENT)
Dept: CT IMAGING | Facility: CLINIC | Age: 65
End: 2020-09-03
Attending: NURSE PRACTITIONER
Payer: COMMERCIAL

## 2020-09-03 ENCOUNTER — HOSPITAL ENCOUNTER (EMERGENCY)
Facility: CLINIC | Age: 65
Discharge: HOME | End: 2020-09-03
Attending: NURSE PRACTITIONER | Admitting: NURSE PRACTITIONER
Payer: COMMERCIAL

## 2020-09-03 ENCOUNTER — APPOINTMENT (OUTPATIENT)
Dept: GENERAL RADIOLOGY | Facility: CLINIC | Age: 65
End: 2020-09-03
Attending: NURSE PRACTITIONER
Payer: COMMERCIAL

## 2020-09-03 VITALS
HEIGHT: 63 IN | HEART RATE: 61 BPM | WEIGHT: 140 LBS | TEMPERATURE: 98.3 F | SYSTOLIC BLOOD PRESSURE: 143 MMHG | DIASTOLIC BLOOD PRESSURE: 82 MMHG | OXYGEN SATURATION: 97 % | RESPIRATION RATE: 16 BRPM | BODY MASS INDEX: 24.8 KG/M2

## 2020-09-03 DIAGNOSIS — S00.83XA CONTUSION OF FACE, INITIAL ENCOUNTER: ICD-10-CM

## 2020-09-03 DIAGNOSIS — S16.1XXA STRAIN OF NECK MUSCLE, INITIAL ENCOUNTER: ICD-10-CM
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
## 2020-09-03 DIAGNOSIS — T14.8XXA ABRASION: ICD-10-CM

## 2020-09-03 PROCEDURE — 72040 X-RAY EXAM NECK SPINE 2-3 VW: CPT

## 2020-09-03 PROCEDURE — 99285 EMERGENCY DEPT VISIT HI MDM: CPT | Mod: 25

## 2020-09-03 PROCEDURE — 90715 TDAP VACCINE 7 YRS/> IM: CPT | Performed by: NURSE PRACTITIONER

## 2020-09-03 PROCEDURE — 90471 IMMUNIZATION ADMIN: CPT

## 2020-09-03 PROCEDURE — 25000128 H RX IP 250 OP 636: Performed by: NURSE PRACTITIONER

## 2020-09-03 PROCEDURE — 70486 CT MAXILLOFACIAL W/O DYE: CPT

## 2020-09-03 PROCEDURE — 70450 CT HEAD/BRAIN W/O DYE: CPT

## 2020-09-03 RX ORDER — CYCLOBENZAPRINE HCL 10 MG
10 TABLET ORAL 3 TIMES DAILY PRN
Qty: 21 TABLET | Refills: 0 | Status: SHIPPED | OUTPATIENT
Start: 2020-09-03 | End: 2024-02-02

## 2020-09-03 RX ADMIN — CLOSTRIDIUM TETANI TOXOID ANTIGEN (FORMALDEHYDE INACTIVATED), CORYNEBACTERIUM DIPHTHERIAE TOXOID ANTIGEN (FORMALDEHYDE INACTIVATED), BORDETELLA PERTUSSIS TOXOID ANTIGEN (GLUTARALDEHYDE INACTIVATED), BORDETELLA PERTUSSIS FILAMENTOUS HEMAGGLUTININ ANTIGEN (FORMALDEHYDE INACTIVATED), BORDETELLA PERTUSSIS PERTACTIN ANTIGEN, AND BORDETELLA PERTUSSIS FIMBRIAE 2/3 ANTIGEN 0.5 ML: 5; 2; 2.5; 5; 3; 5 INJECTION, SUSPENSION INTRAMUSCULAR at 16:14

## 2020-09-03 ASSESSMENT — ENCOUNTER SYMPTOMS
ARTHRALGIAS: 1
JOINT SWELLING: 1
NUMBNESS: 0
WOUND: 1
VOMITING: 0
FACIAL SWELLING: 1
WEAKNESS: 0
SHORTNESS OF BREATH: 0
HEADACHES: 1

## 2020-09-03 ASSESSMENT — MIFFLIN-ST. JEOR: SCORE: 1146.23
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
## 2020-09-03 NOTE — ED AVS SNAPSHOT
Emergency Department  [address] [facility] [address]  Phone:  [phone]  Fax:  [phone]                                    Niecy Toro   MRN: [id number]    Department:   Emergency Department   Date of Visit:  9/3/2020           After Visit Summary Signature Page    I have received my discharge instructions, and my questions have been answered. I have discussed any challenges I see with this plan with the nurse or doctor.    ..........................................................................................................................................  Patient/Patient Representative Signature      ..........................................................................................................................................  Patient Representative Print Name and Relationship to Patient    ..................................................               ................................................  Date                                   Time    ..........................................................................................................................................  Reviewed by Signature/Title    ...................................................              ..............................................  Date                                               Time          22EPIC Rev 08/18

## 2021-12-02 ENCOUNTER — TRANSFERRED RECORDS (OUTPATIENT)
Dept: FAMILY MEDICINE | Facility: CLINIC | Age: 66
End: 2021-12-02

## 2023-11-14 ENCOUNTER — MEDICAL CORRESPONDENCE (OUTPATIENT)
Dept: HEALTH INFORMATION MANAGEMENT | Facility: CLINIC | Age: 68
End: 2023-11-14

## 2023-12-27 ENCOUNTER — ANESTHESIA EVENT (OUTPATIENT)
Dept: SURGERY | Facility: CLINIC | Age: 68
End: 2023-12-27
Payer: COMMERCIAL

## 2023-12-27 RX ORDER — TETRAHYDROZOLINE HCL 0.05 %
1 DROPS OPHTHALMIC (EYE) DAILY PRN
COMMUNITY

## 2023-12-27 RX ORDER — KETOCONAZOLE 20 MG/ML
SHAMPOO TOPICAL DAILY PRN
COMMUNITY

## 2023-12-27 RX ORDER — DULOXETIN HYDROCHLORIDE 60 MG/1
60 CAPSULE, DELAYED RELEASE ORAL DAILY
COMMUNITY

## 2023-12-27 RX ORDER — ALBUTEROL SULFATE 90 UG/1
2 AEROSOL, METERED RESPIRATORY (INHALATION) EVERY 6 HOURS PRN
COMMUNITY
End: 2024-06-04

## 2023-12-27 NOTE — PROGRESS NOTES
PTA medications updated by Medication Scribe prior to surgery via phone call with patient (last doses completed by Nurse)     Medication history sources: Patient and H&P  In the past week, patient estimated taking medication this percent of the time: Greater than 90%      Significant changes made to the medication list:  Patient reports no longer taking the following meds (med scribe removed from PTA med list): Oxycodone, Senokot-s, venlafaxine      Additional medication history information:   Patient was advised to bring: Albuterol HFA, Visine OP soln.    Medication reconciliation completed by provider prior to medication history? No    Time spent in this activity: 28 minutes    The information provided in this note is only as accurate as the sources available at the time of update(s)      Prior to Admission medications    Medication Sig Last Dose Taking? Auth Provider Long Term End Date   acetaminophen (TYLENOL) 500 MG tablet Take 2 tablets (1,000 mg) by mouth every 6 hours as needed for pain Unknown at PRN Yes Parisa Taylor PA-C     albuterol (PROAIR HFA/PROVENTIL HFA/VENTOLIN HFA) 108 (90 Base) MCG/ACT inhaler Inhale 2 puffs into the lungs every 6 hours as needed for shortness of breath, wheezing or cough More than a month at PRN Yes Reported, Patient Yes    cyclobenzaprine (FLEXERIL) 10 MG tablet Take 1 tablet (10 mg) by mouth 3 times daily as needed for muscle spasms More than a month at PRN Yes Jackelin Smith, APRN CNP     DULoxetine (CYMBALTA) 60 MG capsule Take 60 mg by mouth daily  at AM Yes Reported, Patient Yes    ketoconazole (NIZORAL) 2 % external shampoo Apply topically daily as needed for itching or irritation > 2 months ao at PRN Yes Reported, Patient     levothyroxine (SYNTHROID/LEVOTHROID) 150 MCG tablet Take 150 mcg by mouth every morning (before breakfast)  at AM Yes Reported, Patient No    simvastatin (ZOCOR) 10 MG tablet Take 10 mg by mouth every evening  at PM Yes  Reported, Patient No    tetrahydrozoline (VISINE) 0.05 % ophthalmic solution Place 1 drop into both eyes daily as needed Unknown at PRN Yes Reported, Patient     traZODone (DESYREL) 100 MG tablet Take 100 mg by mouth At Bedtime  at PM Yes Reported, Patient Yes        Medication history completed by: Brittney Villaseñor LPN

## 2023-12-28 ENCOUNTER — APPOINTMENT (OUTPATIENT)
Dept: PHYSICAL THERAPY | Facility: CLINIC | Age: 68
End: 2023-12-28
Attending: ORTHOPAEDIC SURGERY
Payer: COMMERCIAL

## 2023-12-28 ENCOUNTER — ANESTHESIA (OUTPATIENT)
Dept: SURGERY | Facility: CLINIC | Age: 68
End: 2023-12-28
Payer: COMMERCIAL

## 2023-12-28 ENCOUNTER — APPOINTMENT (OUTPATIENT)
Dept: GENERAL RADIOLOGY | Facility: CLINIC | Age: 68
End: 2023-12-28
Attending: ORTHOPAEDIC SURGERY
Payer: COMMERCIAL

## 2023-12-28 ENCOUNTER — HOSPITAL ENCOUNTER (OUTPATIENT)
Facility: CLINIC | Age: 68
Discharge: HOME | End: 2023-12-29
Attending: ORTHOPAEDIC SURGERY | Admitting: ORTHOPAEDIC SURGERY
Payer: COMMERCIAL

## 2023-12-28 DIAGNOSIS — Z96.652 S/P REVISION OF TOTAL KNEE, LEFT: ICD-10-CM

## 2023-12-28 DIAGNOSIS — Z96.641 STATUS POST TOTAL HIP REPLACEMENT, RIGHT: Primary | ICD-10-CM

## 2023-12-28 LAB
CREAT SERPL-MCNC: 0.79 MG/DL (ref 0.51–0.95)
EGFRCR SERPLBLD CKD-EPI 2021: 82 ML/MIN/1.73M2
FASTING STATUS PATIENT QL REPORTED: YES
GLUCOSE SERPL-MCNC: 100 MG/DL (ref 70–99)
POTASSIUM SERPL-SCNC: 4.1 MMOL/L (ref 3.4–5.3)

## 2023-12-28 PROCEDURE — 258N000003 HC RX IP 258 OP 636: Performed by: ORTHOPAEDIC SURGERY

## 2023-12-28 PROCEDURE — 250N000025 HC SEVOFLURANE, PER MIN: Performed by: ORTHOPAEDIC SURGERY

## 2023-12-28 PROCEDURE — 97116 GAIT TRAINING THERAPY: CPT | Mod: GP

## 2023-12-28 PROCEDURE — 360N000085 HC SURGERY LEVEL 5 W/ FLUORO, PER MIN: Performed by: ORTHOPAEDIC SURGERY

## 2023-12-28 PROCEDURE — 82947 ASSAY GLUCOSE BLOOD QUANT: CPT | Performed by: STUDENT IN AN ORGANIZED HEALTH CARE EDUCATION/TRAINING PROGRAM

## 2023-12-28 PROCEDURE — 97530 THERAPEUTIC ACTIVITIES: CPT | Mod: GP

## 2023-12-28 PROCEDURE — 250N000011 HC RX IP 250 OP 636: Performed by: NURSE ANESTHETIST, CERTIFIED REGISTERED

## 2023-12-28 PROCEDURE — 258N000003 HC RX IP 258 OP 636: Performed by: STUDENT IN AN ORGANIZED HEALTH CARE EDUCATION/TRAINING PROGRAM

## 2023-12-28 PROCEDURE — 82565 ASSAY OF CREATININE: CPT | Performed by: STUDENT IN AN ORGANIZED HEALTH CARE EDUCATION/TRAINING PROGRAM

## 2023-12-28 PROCEDURE — 84132 ASSAY OF SERUM POTASSIUM: CPT | Performed by: STUDENT IN AN ORGANIZED HEALTH CARE EDUCATION/TRAINING PROGRAM

## 2023-12-28 PROCEDURE — 250N000011 HC RX IP 250 OP 636: Performed by: STUDENT IN AN ORGANIZED HEALTH CARE EDUCATION/TRAINING PROGRAM

## 2023-12-28 PROCEDURE — 250N000013 HC RX MED GY IP 250 OP 250 PS 637: Performed by: ORTHOPAEDIC SURGERY

## 2023-12-28 PROCEDURE — 250N000011 HC RX IP 250 OP 636: Performed by: ORTHOPAEDIC SURGERY

## 2023-12-28 PROCEDURE — 258N000001 HC RX 258: Performed by: ORTHOPAEDIC SURGERY

## 2023-12-28 PROCEDURE — 97161 PT EVAL LOW COMPLEX 20 MIN: CPT | Mod: GP

## 2023-12-28 PROCEDURE — 999N000141 HC STATISTIC PRE-PROCEDURE NURSING ASSESSMENT: Performed by: ORTHOPAEDIC SURGERY

## 2023-12-28 PROCEDURE — 272N000001 HC OR GENERAL SUPPLY STERILE: Performed by: ORTHOPAEDIC SURGERY

## 2023-12-28 PROCEDURE — 250N000013 HC RX MED GY IP 250 OP 250 PS 637: Performed by: STUDENT IN AN ORGANIZED HEALTH CARE EDUCATION/TRAINING PROGRAM

## 2023-12-28 PROCEDURE — 370N000017 HC ANESTHESIA TECHNICAL FEE, PER MIN: Performed by: ORTHOPAEDIC SURGERY

## 2023-12-28 PROCEDURE — 250N000009 HC RX 250: Performed by: STUDENT IN AN ORGANIZED HEALTH CARE EDUCATION/TRAINING PROGRAM

## 2023-12-28 PROCEDURE — 36415 COLL VENOUS BLD VENIPUNCTURE: CPT | Performed by: STUDENT IN AN ORGANIZED HEALTH CARE EDUCATION/TRAINING PROGRAM

## 2023-12-28 PROCEDURE — 93005 ELECTROCARDIOGRAM TRACING: CPT

## 2023-12-28 PROCEDURE — 250N000009 HC RX 250: Performed by: NURSE ANESTHETIST, CERTIFIED REGISTERED

## 2023-12-28 PROCEDURE — 999N000179 XR SURGERY CARM FLUORO LESS THAN 5 MIN W STILLS

## 2023-12-28 PROCEDURE — 250N000009 HC RX 250: Performed by: ORTHOPAEDIC SURGERY

## 2023-12-28 PROCEDURE — 99222 1ST HOSP IP/OBS MODERATE 55: CPT | Performed by: STUDENT IN AN ORGANIZED HEALTH CARE EDUCATION/TRAINING PROGRAM

## 2023-12-28 PROCEDURE — 93010 ELECTROCARDIOGRAM REPORT: CPT | Performed by: INTERNAL MEDICINE

## 2023-12-28 PROCEDURE — C1713 ANCHOR/SCREW BN/BN,TIS/BN: HCPCS | Performed by: ORTHOPAEDIC SURGERY

## 2023-12-28 PROCEDURE — 999N000065 XR PELVIS AND HIP PORTABLE RIGHT 1 VIEW

## 2023-12-28 PROCEDURE — C1776 JOINT DEVICE (IMPLANTABLE): HCPCS | Performed by: ORTHOPAEDIC SURGERY

## 2023-12-28 PROCEDURE — 710N000009 HC RECOVERY PHASE 1, LEVEL 1, PER MIN: Performed by: ORTHOPAEDIC SURGERY

## 2023-12-28 DEVICE — BIOLOX DELTA CERAMIC FEMORAL HEAD +5.0 36MM DIA 12/14 TAPER
Type: IMPLANTABLE DEVICE | Site: HIP | Status: FUNCTIONAL
Brand: BIOLOX DELTA

## 2023-12-28 DEVICE — PINNACLE HIP SOLUTIONS ALTRX POLYETHYLENE ACETABULAR LINER NEUTRAL 36MM ID 52MM OD
Type: IMPLANTABLE DEVICE | Site: HIP | Status: FUNCTIONAL
Brand: PINNACLE ALTRX

## 2023-12-28 DEVICE — PINNACLE CANCELLOUS BONE SCREW 6.5MM X 30MM
Type: IMPLANTABLE DEVICE | Site: HIP | Status: FUNCTIONAL
Brand: PINNACLE

## 2023-12-28 DEVICE — APEX HOLE ELIMINATOR - PS
Type: IMPLANTABLE DEVICE | Site: HIP | Status: FUNCTIONAL
Brand: APEX

## 2023-12-28 DEVICE — PINNACLE POROCOAT ACETABULAR SHELL SECTOR II 52MM OD
Type: IMPLANTABLE DEVICE | Site: HIP | Status: FUNCTIONAL
Brand: PINNACLE POROCOAT

## 2023-12-28 DEVICE — PINNACLE CANCELLOUS BONE SCREW 6.5MM X 25MM
Type: IMPLANTABLE DEVICE | Site: HIP | Status: FUNCTIONAL
Brand: PINNACLE

## 2023-12-28 DEVICE — ACTIS DUOFIX HIP PROSTHESIS (FEMORAL STEM 12/14 TAPER CEMENTLESS SIZE 4 STD COLLAR)  CE
Type: IMPLANTABLE DEVICE | Site: HIP | Status: FUNCTIONAL
Brand: ACTIS

## 2023-12-28 RX ORDER — OXYCODONE HYDROCHLORIDE 5 MG/1
10 TABLET ORAL EVERY 4 HOURS PRN
Status: DISCONTINUED | OUTPATIENT
Start: 2023-12-28 | End: 2023-12-29 | Stop reason: HOSPADM

## 2023-12-28 RX ORDER — OXYCODONE HYDROCHLORIDE 5 MG/1
5 TABLET ORAL EVERY 4 HOURS PRN
Status: DISCONTINUED | OUTPATIENT
Start: 2023-12-28 | End: 2023-12-29 | Stop reason: HOSPADM

## 2023-12-28 RX ORDER — CEFAZOLIN SODIUM/WATER 2 G/20 ML
2 SYRINGE (ML) INTRAVENOUS
Status: COMPLETED | OUTPATIENT
Start: 2023-12-28 | End: 2023-12-28

## 2023-12-28 RX ORDER — ACETAMINOPHEN 325 MG/1
975 TABLET ORAL ONCE
Status: COMPLETED | OUTPATIENT
Start: 2023-12-28 | End: 2023-12-28

## 2023-12-28 RX ORDER — NALOXONE HYDROCHLORIDE 0.4 MG/ML
0.4 INJECTION, SOLUTION INTRAMUSCULAR; INTRAVENOUS; SUBCUTANEOUS
Status: DISCONTINUED | OUTPATIENT
Start: 2023-12-28 | End: 2023-12-29 | Stop reason: HOSPADM

## 2023-12-28 RX ORDER — SODIUM CHLORIDE, SODIUM LACTATE, POTASSIUM CHLORIDE, CALCIUM CHLORIDE 600; 310; 30; 20 MG/100ML; MG/100ML; MG/100ML; MG/100ML
INJECTION, SOLUTION INTRAVENOUS CONTINUOUS
Status: DISCONTINUED | OUTPATIENT
Start: 2023-12-28 | End: 2023-12-28 | Stop reason: HOSPADM

## 2023-12-28 RX ORDER — HYDRALAZINE HYDROCHLORIDE 20 MG/ML
10 INJECTION INTRAMUSCULAR; INTRAVENOUS EVERY 6 HOURS PRN
Status: DISCONTINUED | OUTPATIENT
Start: 2023-12-28 | End: 2023-12-29 | Stop reason: HOSPADM

## 2023-12-28 RX ORDER — FENTANYL CITRATE 0.05 MG/ML
50 INJECTION, SOLUTION INTRAMUSCULAR; INTRAVENOUS EVERY 5 MIN PRN
Status: DISCONTINUED | OUTPATIENT
Start: 2023-12-28 | End: 2023-12-28 | Stop reason: HOSPADM

## 2023-12-28 RX ORDER — VANCOMYCIN HYDROCHLORIDE 1 G/20ML
INJECTION, POWDER, LYOPHILIZED, FOR SOLUTION INTRAVENOUS PRN
Status: DISCONTINUED | OUTPATIENT
Start: 2023-12-28 | End: 2023-12-28 | Stop reason: HOSPADM

## 2023-12-28 RX ORDER — POLYETHYLENE GLYCOL 3350 17 G/17G
17 POWDER, FOR SOLUTION ORAL DAILY
Status: DISCONTINUED | OUTPATIENT
Start: 2023-12-29 | End: 2023-12-29 | Stop reason: HOSPADM

## 2023-12-28 RX ORDER — ASPIRIN 325 MG
325 TABLET, DELAYED RELEASE (ENTERIC COATED) ORAL DAILY
Status: DISCONTINUED | OUTPATIENT
Start: 2023-12-29 | End: 2023-12-29 | Stop reason: HOSPADM

## 2023-12-28 RX ORDER — PROPOFOL 10 MG/ML
INJECTION, EMULSION INTRAVENOUS PRN
Status: DISCONTINUED | OUTPATIENT
Start: 2023-12-28 | End: 2023-12-28

## 2023-12-28 RX ORDER — ACETAMINOPHEN 325 MG/1
975 TABLET ORAL EVERY 8 HOURS
Status: DISCONTINUED | OUTPATIENT
Start: 2023-12-28 | End: 2023-12-29 | Stop reason: HOSPADM

## 2023-12-28 RX ORDER — LABETALOL HYDROCHLORIDE 5 MG/ML
5 INJECTION, SOLUTION INTRAVENOUS ONCE
Status: COMPLETED | OUTPATIENT
Start: 2023-12-28 | End: 2023-12-28

## 2023-12-28 RX ORDER — LEVOTHYROXINE SODIUM 150 UG/1
150 TABLET ORAL
Status: DISCONTINUED | OUTPATIENT
Start: 2023-12-29 | End: 2023-12-29 | Stop reason: HOSPADM

## 2023-12-28 RX ORDER — LIDOCAINE 40 MG/G
CREAM TOPICAL
Status: DISCONTINUED | OUTPATIENT
Start: 2023-12-28 | End: 2023-12-29 | Stop reason: HOSPADM

## 2023-12-28 RX ORDER — OXYCODONE HYDROCHLORIDE 5 MG/1
TABLET ORAL
Qty: 30 TABLET | Refills: 0 | Status: SHIPPED | OUTPATIENT
Start: 2023-12-28 | End: 2024-02-02

## 2023-12-28 RX ORDER — HYDROXYZINE HYDROCHLORIDE 10 MG/1
10 TABLET, FILM COATED ORAL EVERY 6 HOURS PRN
Status: DISCONTINUED | OUTPATIENT
Start: 2023-12-28 | End: 2023-12-29 | Stop reason: HOSPADM

## 2023-12-28 RX ORDER — SODIUM CHLORIDE, SODIUM LACTATE, POTASSIUM CHLORIDE, CALCIUM CHLORIDE 600; 310; 30; 20 MG/100ML; MG/100ML; MG/100ML; MG/100ML
INJECTION, SOLUTION INTRAVENOUS CONTINUOUS
Status: DISCONTINUED | OUTPATIENT
Start: 2023-12-28 | End: 2023-12-29 | Stop reason: HOSPADM

## 2023-12-28 RX ORDER — OMEPRAZOLE 20 MG/1
20 TABLET, DELAYED RELEASE ORAL DAILY
Qty: 42 TABLET | Refills: 0 | Status: SHIPPED | OUTPATIENT
Start: 2023-12-28 | End: 2024-02-02

## 2023-12-28 RX ORDER — TRANEXAMIC ACID 650 MG/1
1950 TABLET ORAL ONCE
Status: COMPLETED | OUTPATIENT
Start: 2023-12-28 | End: 2023-12-28

## 2023-12-28 RX ORDER — ONDANSETRON 4 MG/1
4 TABLET, ORALLY DISINTEGRATING ORAL EVERY 30 MIN PRN
Status: DISCONTINUED | OUTPATIENT
Start: 2023-12-28 | End: 2023-12-28 | Stop reason: HOSPADM

## 2023-12-28 RX ORDER — NALOXONE HYDROCHLORIDE 0.4 MG/ML
0.2 INJECTION, SOLUTION INTRAMUSCULAR; INTRAVENOUS; SUBCUTANEOUS
Status: DISCONTINUED | OUTPATIENT
Start: 2023-12-28 | End: 2023-12-29 | Stop reason: HOSPADM

## 2023-12-28 RX ORDER — ALBUTEROL SULFATE 90 UG/1
2 AEROSOL, METERED RESPIRATORY (INHALATION) EVERY 6 HOURS PRN
Status: DISCONTINUED | OUTPATIENT
Start: 2023-12-28 | End: 2023-12-29 | Stop reason: HOSPADM

## 2023-12-28 RX ORDER — ACETAMINOPHEN 325 MG/1
975 TABLET ORAL EVERY 6 HOURS PRN
Qty: 100 TABLET | Refills: 0 | Status: SHIPPED | OUTPATIENT
Start: 2023-12-28 | End: 2024-02-02

## 2023-12-28 RX ORDER — AMOXICILLIN 250 MG
1-2 CAPSULE ORAL 2 TIMES DAILY
Qty: 30 TABLET | Refills: 0 | Status: SHIPPED | OUTPATIENT
Start: 2023-12-28 | End: 2024-02-02

## 2023-12-28 RX ORDER — CEFAZOLIN SODIUM 2 G/100ML
2 INJECTION, SOLUTION INTRAVENOUS EVERY 8 HOURS
Qty: 200 ML | Refills: 0 | Status: COMPLETED | OUTPATIENT
Start: 2023-12-28 | End: 2023-12-29

## 2023-12-28 RX ORDER — AMOXICILLIN 250 MG
1 CAPSULE ORAL 2 TIMES DAILY
Status: DISCONTINUED | OUTPATIENT
Start: 2023-12-28 | End: 2023-12-29 | Stop reason: HOSPADM

## 2023-12-28 RX ORDER — FENTANYL CITRATE 0.05 MG/ML
25 INJECTION, SOLUTION INTRAMUSCULAR; INTRAVENOUS EVERY 5 MIN PRN
Status: DISCONTINUED | OUTPATIENT
Start: 2023-12-28 | End: 2023-12-28 | Stop reason: HOSPADM

## 2023-12-28 RX ORDER — FENTANYL CITRATE 50 UG/ML
INJECTION, SOLUTION INTRAMUSCULAR; INTRAVENOUS PRN
Status: DISCONTINUED | OUTPATIENT
Start: 2023-12-28 | End: 2023-12-28

## 2023-12-28 RX ORDER — HYDROMORPHONE HCL IN WATER/PF 6 MG/30 ML
0.4 PATIENT CONTROLLED ANALGESIA SYRINGE INTRAVENOUS EVERY 5 MIN PRN
Status: DISCONTINUED | OUTPATIENT
Start: 2023-12-28 | End: 2023-12-28 | Stop reason: HOSPADM

## 2023-12-28 RX ORDER — ACETAMINOPHEN 325 MG/1
650 TABLET ORAL EVERY 4 HOURS PRN
Status: DISCONTINUED | OUTPATIENT
Start: 2023-12-31 | End: 2023-12-29 | Stop reason: HOSPADM

## 2023-12-28 RX ORDER — PROPOFOL 10 MG/ML
INJECTION, EMULSION INTRAVENOUS CONTINUOUS PRN
Status: DISCONTINUED | OUTPATIENT
Start: 2023-12-28 | End: 2023-12-28

## 2023-12-28 RX ORDER — NEOSTIGMINE METHYLSULFATE 1 MG/ML
VIAL (ML) INJECTION PRN
Status: DISCONTINUED | OUTPATIENT
Start: 2023-12-28 | End: 2023-12-28

## 2023-12-28 RX ORDER — HYDROMORPHONE HCL IN WATER/PF 6 MG/30 ML
0.2 PATIENT CONTROLLED ANALGESIA SYRINGE INTRAVENOUS
Status: DISCONTINUED | OUTPATIENT
Start: 2023-12-28 | End: 2023-12-29 | Stop reason: HOSPADM

## 2023-12-28 RX ORDER — MAGNESIUM HYDROXIDE 1200 MG/15ML
LIQUID ORAL PRN
Status: DISCONTINUED | OUTPATIENT
Start: 2023-12-28 | End: 2023-12-28 | Stop reason: HOSPADM

## 2023-12-28 RX ORDER — CELECOXIB 200 MG/1
200 CAPSULE ORAL DAILY
Qty: 42 CAPSULE | Refills: 0 | Status: SHIPPED | OUTPATIENT
Start: 2023-12-28 | End: 2024-02-02

## 2023-12-28 RX ORDER — ONDANSETRON 4 MG/1
4 TABLET, ORALLY DISINTEGRATING ORAL EVERY 6 HOURS PRN
Status: DISCONTINUED | OUTPATIENT
Start: 2023-12-28 | End: 2023-12-29 | Stop reason: HOSPADM

## 2023-12-28 RX ORDER — DIPHENHYDRAMINE HCL 12.5MG/5ML
12.5 LIQUID (ML) ORAL DAILY PRN
Status: DISCONTINUED | OUTPATIENT
Start: 2023-12-28 | End: 2023-12-29 | Stop reason: HOSPADM

## 2023-12-28 RX ORDER — HYDROMORPHONE HCL IN WATER/PF 6 MG/30 ML
0.4 PATIENT CONTROLLED ANALGESIA SYRINGE INTRAVENOUS
Status: DISCONTINUED | OUTPATIENT
Start: 2023-12-28 | End: 2023-12-29 | Stop reason: HOSPADM

## 2023-12-28 RX ORDER — DEXAMETHASONE SODIUM PHOSPHATE 4 MG/ML
INJECTION, SOLUTION INTRA-ARTICULAR; INTRALESIONAL; INTRAMUSCULAR; INTRAVENOUS; SOFT TISSUE PRN
Status: DISCONTINUED | OUTPATIENT
Start: 2023-12-28 | End: 2023-12-28

## 2023-12-28 RX ORDER — CELECOXIB 200 MG/1
400 CAPSULE ORAL ONCE
Status: COMPLETED | OUTPATIENT
Start: 2023-12-28 | End: 2023-12-28

## 2023-12-28 RX ORDER — DULOXETIN HYDROCHLORIDE 60 MG/1
60 CAPSULE, DELAYED RELEASE ORAL DAILY
Status: DISCONTINUED | OUTPATIENT
Start: 2023-12-29 | End: 2023-12-29 | Stop reason: HOSPADM

## 2023-12-28 RX ORDER — DEXMEDETOMIDINE HYDROCHLORIDE 4 UG/ML
INJECTION, SOLUTION INTRAVENOUS PRN
Status: DISCONTINUED | OUTPATIENT
Start: 2023-12-28 | End: 2023-12-28

## 2023-12-28 RX ORDER — METHOCARBAMOL 500 MG/1
500 TABLET, FILM COATED ORAL EVERY 6 HOURS PRN
Status: DISCONTINUED | OUTPATIENT
Start: 2023-12-28 | End: 2023-12-29 | Stop reason: HOSPADM

## 2023-12-28 RX ORDER — ONDANSETRON 2 MG/ML
4 INJECTION INTRAMUSCULAR; INTRAVENOUS EVERY 6 HOURS PRN
Status: DISCONTINUED | OUTPATIENT
Start: 2023-12-28 | End: 2023-12-29 | Stop reason: HOSPADM

## 2023-12-28 RX ORDER — TRAZODONE HYDROCHLORIDE 100 MG/1
100 TABLET ORAL AT BEDTIME
Status: DISCONTINUED | OUTPATIENT
Start: 2023-12-28 | End: 2023-12-29 | Stop reason: HOSPADM

## 2023-12-28 RX ORDER — ONDANSETRON 2 MG/ML
INJECTION INTRAMUSCULAR; INTRAVENOUS PRN
Status: DISCONTINUED | OUTPATIENT
Start: 2023-12-28 | End: 2023-12-28

## 2023-12-28 RX ORDER — ASPIRIN 325 MG
325 TABLET, DELAYED RELEASE (ENTERIC COATED) ORAL DAILY
Qty: 42 TABLET | Refills: 0 | Status: SHIPPED | OUTPATIENT
Start: 2023-12-28 | End: 2024-02-08

## 2023-12-28 RX ORDER — ONDANSETRON 2 MG/ML
4 INJECTION INTRAMUSCULAR; INTRAVENOUS EVERY 30 MIN PRN
Status: DISCONTINUED | OUTPATIENT
Start: 2023-12-28 | End: 2023-12-28 | Stop reason: HOSPADM

## 2023-12-28 RX ORDER — PREGABALIN 150 MG/1
150 CAPSULE ORAL ONCE
Status: COMPLETED | OUTPATIENT
Start: 2023-12-28 | End: 2023-12-28

## 2023-12-28 RX ORDER — KETOROLAC TROMETHAMINE 15 MG/ML
15 INJECTION, SOLUTION INTRAMUSCULAR; INTRAVENOUS EVERY 6 HOURS
Qty: 4 ML | Refills: 0 | Status: DISCONTINUED | OUTPATIENT
Start: 2023-12-28 | End: 2023-12-29 | Stop reason: HOSPADM

## 2023-12-28 RX ORDER — SIMVASTATIN 10 MG
10 TABLET ORAL EVERY EVENING
Status: DISCONTINUED | OUTPATIENT
Start: 2023-12-28 | End: 2023-12-29 | Stop reason: HOSPADM

## 2023-12-28 RX ORDER — PROCHLORPERAZINE MALEATE 5 MG
5 TABLET ORAL EVERY 6 HOURS PRN
Status: DISCONTINUED | OUTPATIENT
Start: 2023-12-28 | End: 2023-12-29 | Stop reason: HOSPADM

## 2023-12-28 RX ORDER — BISACODYL 10 MG
10 SUPPOSITORY, RECTAL RECTAL DAILY PRN
Status: DISCONTINUED | OUTPATIENT
Start: 2023-12-28 | End: 2023-12-29 | Stop reason: HOSPADM

## 2023-12-28 RX ORDER — CALCIUM CARBONATE 500 MG/1
500 TABLET, CHEWABLE ORAL 4 TIMES DAILY PRN
Status: DISCONTINUED | OUTPATIENT
Start: 2023-12-28 | End: 2023-12-29 | Stop reason: HOSPADM

## 2023-12-28 RX ORDER — CEFAZOLIN SODIUM/WATER 2 G/20 ML
2 SYRINGE (ML) INTRAVENOUS SEE ADMIN INSTRUCTIONS
Status: DISCONTINUED | OUTPATIENT
Start: 2023-12-28 | End: 2023-12-28 | Stop reason: HOSPADM

## 2023-12-28 RX ORDER — GLYCOPYRROLATE 0.2 MG/ML
INJECTION, SOLUTION INTRAMUSCULAR; INTRAVENOUS PRN
Status: DISCONTINUED | OUTPATIENT
Start: 2023-12-28 | End: 2023-12-28

## 2023-12-28 RX ORDER — HYDROMORPHONE HCL IN WATER/PF 6 MG/30 ML
0.2 PATIENT CONTROLLED ANALGESIA SYRINGE INTRAVENOUS EVERY 5 MIN PRN
Status: DISCONTINUED | OUTPATIENT
Start: 2023-12-28 | End: 2023-12-28 | Stop reason: HOSPADM

## 2023-12-28 RX ADMIN — SODIUM CHLORIDE, POTASSIUM CHLORIDE, SODIUM LACTATE AND CALCIUM CHLORIDE: 600; 310; 30; 20 INJECTION, SOLUTION INTRAVENOUS at 17:24

## 2023-12-28 RX ADMIN — LABETALOL HYDROCHLORIDE 5 MG: 5 INJECTION INTRAVENOUS at 10:26

## 2023-12-28 RX ADMIN — ACETAMINOPHEN 975 MG: 325 TABLET, FILM COATED ORAL at 06:07

## 2023-12-28 RX ADMIN — FENTANYL CITRATE 50 MCG: 50 INJECTION INTRAMUSCULAR; INTRAVENOUS at 09:01

## 2023-12-28 RX ADMIN — PROPOFOL 30 MCG/KG/MIN: 10 INJECTION, EMULSION INTRAVENOUS at 07:54

## 2023-12-28 RX ADMIN — SODIUM CHLORIDE, POTASSIUM CHLORIDE, SODIUM LACTATE AND CALCIUM CHLORIDE: 600; 310; 30; 20 INJECTION, SOLUTION INTRAVENOUS at 13:56

## 2023-12-28 RX ADMIN — KETOROLAC TROMETHAMINE 15 MG: 15 INJECTION, SOLUTION INTRAMUSCULAR; INTRAVENOUS at 17:17

## 2023-12-28 RX ADMIN — FENTANYL CITRATE 50 MCG: 50 INJECTION INTRAMUSCULAR; INTRAVENOUS at 08:11

## 2023-12-28 RX ADMIN — HYDROMORPHONE HYDROCHLORIDE 0.5 MG: 1 INJECTION, SOLUTION INTRAMUSCULAR; INTRAVENOUS; SUBCUTANEOUS at 08:21

## 2023-12-28 RX ADMIN — Medication 2 G: at 07:37

## 2023-12-28 RX ADMIN — TRAZODONE HYDROCHLORIDE 100 MG: 100 TABLET ORAL at 21:03

## 2023-12-28 RX ADMIN — DEXMEDETOMIDINE HYDROCHLORIDE 12 MCG: 200 INJECTION INTRAVENOUS at 08:09

## 2023-12-28 RX ADMIN — SODIUM CHLORIDE, POTASSIUM CHLORIDE, SODIUM LACTATE AND CALCIUM CHLORIDE: 600; 310; 30; 20 INJECTION, SOLUTION INTRAVENOUS at 06:50

## 2023-12-28 RX ADMIN — KETOROLAC TROMETHAMINE 15 MG: 15 INJECTION, SOLUTION INTRAMUSCULAR; INTRAVENOUS at 23:56

## 2023-12-28 RX ADMIN — SODIUM CHLORIDE, POTASSIUM CHLORIDE, SODIUM LACTATE AND CALCIUM CHLORIDE: 600; 310; 30; 20 INJECTION, SOLUTION INTRAVENOUS at 09:10

## 2023-12-28 RX ADMIN — GLYCOPYRROLATE 0.3 MG: 0.2 INJECTION, SOLUTION INTRAMUSCULAR; INTRAVENOUS at 09:31

## 2023-12-28 RX ADMIN — PROPOFOL 150 MG: 10 INJECTION, EMULSION INTRAVENOUS at 07:42

## 2023-12-28 RX ADMIN — ACETAMINOPHEN 975 MG: 325 TABLET, FILM COATED ORAL at 14:03

## 2023-12-28 RX ADMIN — DEXAMETHASONE SODIUM PHOSPHATE 10 MG: 4 INJECTION, SOLUTION INTRA-ARTICULAR; INTRALESIONAL; INTRAMUSCULAR; INTRAVENOUS; SOFT TISSUE at 07:54

## 2023-12-28 RX ADMIN — TRANEXAMIC ACID 1950 MG: 650 TABLET ORAL at 06:07

## 2023-12-28 RX ADMIN — ROCURONIUM BROMIDE 20 MG: 50 INJECTION, SOLUTION INTRAVENOUS at 08:21

## 2023-12-28 RX ADMIN — ACETAMINOPHEN 975 MG: 325 TABLET, FILM COATED ORAL at 21:03

## 2023-12-28 RX ADMIN — CEFAZOLIN SODIUM 2 G: 2 INJECTION, SOLUTION INTRAVENOUS at 23:51

## 2023-12-28 RX ADMIN — MIDAZOLAM 2 MG: 1 INJECTION INTRAMUSCULAR; INTRAVENOUS at 07:37

## 2023-12-28 RX ADMIN — CELECOXIB 400 MG: 200 CAPSULE ORAL at 06:07

## 2023-12-28 RX ADMIN — CEFAZOLIN SODIUM 2 G: 2 INJECTION, SOLUTION INTRAVENOUS at 15:52

## 2023-12-28 RX ADMIN — DOCUSATE SODIUM 50 MG AND SENNOSIDES 8.6 MG 1 TABLET: 8.6; 5 TABLET, FILM COATED ORAL at 20:49

## 2023-12-28 RX ADMIN — FENTANYL CITRATE 50 MCG: 50 INJECTION INTRAMUSCULAR; INTRAVENOUS at 08:43

## 2023-12-28 RX ADMIN — FENTANYL CITRATE 50 MCG: 50 INJECTION INTRAMUSCULAR; INTRAVENOUS at 07:42

## 2023-12-28 RX ADMIN — ONDANSETRON 4 MG: 2 INJECTION INTRAMUSCULAR; INTRAVENOUS at 09:31

## 2023-12-28 RX ADMIN — GLYCOPYRROLATE 0.2 MG: 0.2 INJECTION, SOLUTION INTRAMUSCULAR; INTRAVENOUS at 08:30

## 2023-12-28 RX ADMIN — PREGABALIN 150 MG: 150 CAPSULE ORAL at 06:07

## 2023-12-28 RX ADMIN — DEXMEDETOMIDINE HYDROCHLORIDE 8 MCG: 200 INJECTION INTRAVENOUS at 08:28

## 2023-12-28 RX ADMIN — NEOSTIGMINE METHYLSULFATE 2.5 MG: 1 INJECTION, SOLUTION INTRAVENOUS at 09:31

## 2023-12-28 RX ADMIN — SIMVASTATIN 10 MG: 10 TABLET, FILM COATED ORAL at 20:49

## 2023-12-28 RX ADMIN — ROCURONIUM BROMIDE 50 MG: 50 INJECTION, SOLUTION INTRAVENOUS at 07:42

## 2023-12-28 ASSESSMENT — ACTIVITIES OF DAILY LIVING (ADL)
ADLS_ACUITY_SCORE: 20
ADLS_ACUITY_SCORE: 23
ADLS_ACUITY_SCORE: 21
ADLS_ACUITY_SCORE: 21
ADLS_ACUITY_SCORE: 23
ADLS_ACUITY_SCORE: 22
ADLS_ACUITY_SCORE: 20

## 2023-12-28 NOTE — PROGRESS NOTES
12/28/23 1700   Appointment Info   Signing Clinician's Name / Credentials (PT) Lucas Haque DPT   Rehab Comments (PT) WBAT RLE, no precautions   Quick Adds   Quick Adds Certification   Living Environment   People in Home spouse   Current Living Arrangements house   Home Accessibility stairs to enter home;stairs within home   Number of Stairs, Main Entrance 2   Stair Railings, Main Entrance none   Number of Stairs, Within Home, Primary greater than 10 stairs   Stair Railings, Within Home, Primary railings safe and in good condition   Transportation Anticipated family or friend will provide   Living Environment Comments Pt has flight of stairs to bedroom   Self-Care   Usual Activity Tolerance good   Current Activity Tolerance moderate   Equipment Currently Used at Home none   Fall history within last six months no   Activity/Exercise/Self-Care Comment At baseline pt is IND with all mobility and ADL's   General Information   Onset of Illness/Injury or Date of Surgery 12/28/23   Referring Physician Yohan Alamo MD   Patient/Family Therapy Goals Statement (PT) go home   Pertinent History of Current Problem (include personal factors and/or comorbidities that impact the POC) Pt is a 68 y.o. female s/p R direct anterior NICOLASA on 12/28/2023, POD#0   Existing Precautions/Restrictions fall;weight bearing   Weight-Bearing Status - LLE full weight-bearing   Weight-Bearing Status - RLE weight-bearing as tolerated   Cognition   Affect/Mental Status (Cognition) WNL   Orientation Status (Cognition) oriented x 4   Follows Commands (Cognition) WNL   Pain Assessment   Patient Currently in Pain No   Integumentary/Edema   Integumentary/Edema no deficits were identifed   Posture    Posture Not impaired   Range of Motion (ROM)   Range of Motion ROM deficits secondary to surgical procedure;ROM deficits secondary to pain;ROM deficits secondary to weakness   ROM Comment Deficits with R hip post NICOLASA, otherwise appears grossly WFL    Strength (Manual Muscle Testing)   Strength (Manual Muscle Testing) Able to perform R SLR;Able to perform L SLR;Deficits observed during functional mobility   Strength Comments Not formally assessed, defictis with R hip post NICOLASA, appears grossly antigravity strength   Bed Mobility   Comment, (Bed Mobility) supine<>sit SBA   Transfers   Comment, (Transfers) sit<>stand with FWW CGA   Gait/Stairs (Locomotion)   [place] Level (Gait) contact guard   Assistive Device (Gait) walker, front-wheeled   Distance in Feet (Gait) 10' eval + 250'   Pattern (Gait) step-through   Deviations/Abnormal Patterns (Gait) antalgic;day decreased;gait speed decreased;stride length decreased;weight shifting decreased   Maintains Weight-bearing Status (Gait) able to maintain   Balance   Balance Comments deficits with dynamic balance   Sensory Examination   Sensory Perception patient reports no sensory changes   Coordination   Coordination no deficits were identified   Clinical Impression   Criteria for Skilled Therapeutic Intervention Yes, treatment indicated   PT Diagnosis (PT) impaired gait   Influenced by the following impairments pain, deficits with ROM, strength, balance   Functional limitations due to impairments bed mobility, transfers, amb, ADL's   Clinical Presentation (PT Evaluation Complexity) stable   Clinical Presentation Rationale PMH and clinical judgement   Clinical Decision Making (Complexity) low complexity   Planned Therapy Interventions (PT) balance training;cryotherapy;bed mobility training;gait training;home exercise program;patient/family education;stair training;ROM (range of motion);strengthening;stretching;transfer training;progressive activity/exercise   Risk & Benefits of therapy have been explained evaluation/treatment results reviewed;care plan/treatment goals reviewed;risks/benefits reviewed;current/potential barriers reviewed;participants voiced agreement with care plan;participants included;patient    PT Total Evaluation Time   PT Eval, Low Complexity Minutes (12386) 10   Therapy Certification   Start of care date 12/28/23   Certification date from 12/28/23   Certification date to 01/04/24   Medical Diagnosis R NICOLASA   Physical Therapy Goals   PT Frequency 2x/day   PT Predicted Duration/Target Date for Goal Attainment 12/29/23   PT Goals Bed Mobility;Transfers;Gait;Stairs   PT: Bed Mobility Modified independent;Supine to/from sit;Rolling;Bridging   PT: Transfers Modified independent;Sit to/from stand;Assistive device   PT: Gait Modified independent;Rolling walker;150 feet   PT: Stairs Minimal assist;Greater than 10 stairs;Rail on left;Rail on right   Interventions   Interventions Quick Adds Gait Training;Therapeutic Activity;Therapeutic Procedure   Therapeutic Procedure/Exercise   Ther. Procedure: strength, endurance, ROM, flexibillity Minutes (12132) 1   Symptoms Noted During/After Treatment none   Treatment Detail/Skilled Intervention Educated on circulatory benefits of AP's after surgery and to perform throughout the day whenever resting. With pt in supine cued for AP's x 30 and pt tolerated well   Therapeutic Activity   Therapeutic Activities: dynamic activities to improve functional performance Minutes (96888) 15   Symptoms Noted During/After Treatment None   Treatment Detail/Skilled Intervention Greeted pt supine in bed. Eval completed with treatment indicated. Educated on orders for WBAT and no hip precautions, pt verbalized understanding. With HOB elevated supine>sit SBA min cueing for sequencing. End of session pt performed sit>supine SBA, difficulty at first, cued to hook L foot under R to assist RLE into bed and pt able to perform slowly with SBA. With pt supine in bed IND with repositioning. Sit<>stand x 2 with FWW CGA-SBA, cues for safe walker and hand placement. Pt amb to toilet twice, unable to void on first attempt, performed stand<>sit x2  from RTS with FWW and SBA, cues for set up and  sequencing. Increased time for line management and room set up   Gait Training   Gait Training Minutes (10944) 8   Symptoms Noted During/After Treatment (Gait Training) fatigue   Treatment Detail/Skilled Intervention Pt amb in room and into hallway ~250' with FWW and CGA progressing to SBA. Pt initially used long step lengths with step through pattern, mild instability so cued for shorter steps and stability improved. Cues for safe walker placement and forward gaze. Pt moved well overall and had no overt LOB   Distance in Feet 250'   [place] Level (Gait Training) stand-by assist   Physical Assistance Level (Gait Training) supervision   Weight Bearing (Gait Training) weight-bearing as tolerated   Assistive Device (Gait Training) rolling walker   PT Discharge Planning   PT Plan trial steps, progress gait with FWW, progress transfers and bed mobility   PT Discharge Recommendation (DC Rec)   (defer to ortho)   PT Rationale for DC Rec Pt below baseline but moving well and using safe technique. Anticipate pt will progress and by discharge be at/near Mod-I bed mobility, Mod-I transfers with FWW, Mod-I amb with FWW, [place] for steps. Pt will have good 24/7 support at home from spouse   PT Brief overview of current status SBA all mobility with FWW   PT Equipment Needed at Discharge   (pt has FWW and RTS)   Total Session Time   Timed Code Treatment Minutes 24   Total Session Time (sum of timed and untimed services) 34       [facility]  OUTPATIENT PHYSICAL THERAPY EVALUATION  PLAN OF TREATMENT FOR OUTPATIENT REHABILITATION  (COMPLETE FOR INITIAL CLAIMS ONLY)  Patient's Last Name, First Name, M.I.  YOB: 1955  Niecy Toro                        Provider's Name  [facility] Medical Record No.  [id number]                             Onset Date:  12/28/23   Start of Care Date:  12/28/23   Type:     _X_PT   ___OT   ___SLP Medical Diagnosis:  R NICOLASA               PT Diagnosis:  impaired gait Visits from SOC:  1     See note for plan of treatment, functional goals and certification details    I CERTIFY THE NEED FOR THESE SERVICES FURNISHED UNDER        THIS PLAN OF TREATMENT AND WHILE UNDER MY CARE     (Physician co-signature of this document indicates review and certification of the therapy plan).

## 2023-12-28 NOTE — ANESTHESIA POSTPROCEDURE EVALUATION
Patient: Niecy Toro    Procedure: Procedure(s):  RIGHT TOTAL HIP ARTHRPLASTY DIRECT ANTERIOR APPROACH       Anesthesia Type:  General    Note:  Disposition: Inpatient   Postop Pain Control: Uneventful            Sign Out: Well controlled pain   PONV: No   Neuro/Psych: Uneventful            Sign Out: Acceptable/Baseline neuro status   Airway/Respiratory: Uneventful            Sign Out: Acceptable/Baseline resp. status   CV/Hemodynamics: Uneventful            Sign Out: Acceptable CV status   Other NRE: NONE   DID A NON-ROUTINE EVENT OCCUR?            Last vitals:  Vitals Value Taken Time   /66 12/28/23 1030   Temp 37.1  C (98.8  F) 12/28/23 1006   Pulse 62 12/28/23 1032   Resp 12 12/28/23 1032   SpO2 98 % 12/28/23 1032   Vitals shown include unfiled device data.    Electronically Signed By: Jd Kelley MD  December 28, 2023  10:33 AM

## 2023-12-28 NOTE — ANESTHESIA CARE TRANSFER NOTE
Patient: Niecy DE LA ROSA Muna    Procedure: Procedure(s):  RIGHT TOTAL HIP ARTHRPLASTY DIRECT ANTERIOR APPROACH       Diagnosis: Primary osteoarthritis of right hip [M16.11]  Diagnosis Additional Information: No value filed.    Anesthesia Type:   General     Note:    Oropharynx: oropharynx clear of all foreign objects and spontaneously breathing  Level of Consciousness: awake and drowsy  Oxygen Supplementation: face mask  Level of Supplemental Oxygen (L/min / FiO2): 6  Independent Airway: airway patency satisfactory and stable  Dentition: dentition unchanged  Vital Signs Stable: post-procedure vital signs reviewed and stable  Report to RN Given: handoff report given  Patient transferred to: PACU  Comments: Neuromuscular blockade reversed after TOF 4/4, spontaneous respirations, adequate tidal volumes, followed commands to voice, oropharynx suctioned with soft flexible catheter, extubated atraumatically, extubated with suction, airway patent after extubation.  Oxygen via facemask at 6 liters per minute to PACU. Oxygen tubing connected to wall O2 in PACU, SpO2, NiBP, and EKG monitors and alarms on and functioning, Matty Hugger warmer connected to patient gown, report on patient's clinical status given to PACU RN, RN questions answered.         Handoff Report: Identifed the Patient, Identified the Reponsible Provider, Reviewed the pertinent medical history, Discussed the surgical course, Reviewed Intra-OP anesthesia mangement and issues during anesthesia, Set expectations for post-procedure period and Allowed opportunity for questions and acknowledgement of understanding      Vitals:  Vitals Value Taken Time   BP     Temp     Pulse 76 12/28/23 1005   Resp 21 12/28/23 1005   SpO2 100 % 12/28/23 1005   Vitals shown include unfiled device data.    Electronically Signed By: OLIVIA Mojica CRNA  December 28, 2023  10:07 AM

## 2023-12-28 NOTE — ANESTHESIA PROCEDURE NOTES
Airway       Patient location during procedure: OR       Procedure Start/Stop Times: 12/28/2023 7:45 AM  Staff -        Anesthesiologist:  Jd Kelley MD       CRNA: Luciana Dela Cruz APRN CRNA       Performed By: anesthesiologist  Consent for Airway        Urgency: elective  Indications and Patient Condition       Indications for airway management: shira-procedural       Induction type:intravenous       Mask difficulty assessment: 1 - vent by mask    Final Airway Details       Final airway type: endotracheal airway       Successful airway: ETT - single  Endotracheal Airway Details        ETT size (mm): 7.0       Cuffed: yes       Successful intubation technique: direct laryngoscopy       DL Blade Type: MAC 3       Grade View of Cords: 3       Adjucts: stylet       Position: Right       Measured from: gums/teeth       Secured at (cm): 21       Bite block used: None    Post intubation assessment        Placement verified by: capnometry and chest rise        Number of attempts at approach: 2 (Grade 3+ view. Suggest glidescope)       Secured with: tape       Ease of procedure: easy       Dentition: Intact and Unchanged    Medication(s) Administered   Medication Administration Time: 12/28/2023 7:45 AM

## 2023-12-28 NOTE — OR NURSING
Transfer criteria met.  Order received per Dr. Kelley to transfer to [facility] for continued recovery.  Hand-off report given to RN.  To 2326-1 per cart with all belongings, including personal CPAP.  Will transport with Capnography monitoring.   Bill notified of transfer.

## 2023-12-28 NOTE — BRIEF OP NOTE
[facility]    Brief Operative Note    Pre-operative diagnosis: Right hip OA  Post-operative diagnosis same  Procedure: RIGHT DIRECT ANTERIOR TOTAL HIP ARTHROPLASTY  Surgeon(s) and Role:     * Yohan Alamo MD - Primary     * Katya Enamorado PA-C - Assisting     * KARY Boss, KELLY-C - Assisting  Anesthesia: General   Estimated blood loss: 300 ml  Drains:  None  Specimens: None  Findings:  Advanced OA  Complications: None    Plan: DC home POD1 w/family assist.  DVT prophylaxis w/ASA 325mg QD x6wks.    Implants:   Implant Name Type Inv. Item Serial No.  Lot No. LRB No. Used Action   IMP CUP ACE PINNACLE 52MM 1217- - [id number] Total Joint Component/Insert IMP CUP ACE PINNACLE 52MM 1217-  [employer] [facility]- [id number] Right 1 Implanted   IMP APEX HOLE ELIMINATOR HIP DEPUY DURALOC 1246- - [id number] Metallic Hardware/Irving IMP APEX HOLE ELIMINATOR HIP DEPUY DURALOC 1246-  [employer]- [id number] Right 1 Implanted   IMP INSERT HIP DEPUY PINNACLE ALTRX [id number] 1221- - [id number] Total Joint Component/Insert IMP INSERT HIP DEPUY PINNACLE ALTRX [id number] 1221-  [employer]- [id number] Right 1 Implanted   IMP SCR BONE CAN ACE 6.5X30MM 1217- - [id number] Metallic Hardware/Irving IMP SCR BONE CAN ACE 6.5X30MM 1217-  [employer]- [id number] Right 1 Implanted   IMP SCR BONE CAN ACE 6.5X25MM 1217- - [id number] Metallic Hardware/Irving IMP SCR BONE CAN ACE 6.5X25MM 1217-  [employer]- [id number] Right 1 Implanted   IMP STEM FEM DEPUY ACTIS STD COLLAR TPR SZ 4MM 1010- - [id number] Total Joint Component/Insert IMP STEM FEM DEPUY ACTIS STD COLLAR TPR SZ 4MM 1010-  [employer]- [id number] Right 1 Implanted   IMP HEAD FEMORAL DEPUY CERAMIC 36MM +5MM [id number] - [id number] Total Joint Component/Insert IMP HEAD FEMORAL DEPUY CERAMIC 36MM +5MM [id number]  &[facility]- [id number] Right  1 Implanted

## 2023-12-28 NOTE — OR NURSING
JESÚS Stein bedside. Pt complaining of double vision. VSS and pt stable. Dr stein cleared to transfer to floor. Sign out given bedside.

## 2023-12-28 NOTE — ANESTHESIA PREPROCEDURE EVALUATION
Anesthesia Pre-Procedure Evaluation    Patient: Niecy Toro   MRN: [id number] : 1955        Procedure : Procedure(s):  RIGHT TOTAL HIP ARTHRPLASTY DIRECT ANTERIOR APPROACH          Past Medical History:   Diagnosis Date    Anxiety     Arthritis     Depression     Depressive disorder     Hyperlipidemia     Hypothyroidism     Insomnia     Motion sickness     Osteopenia     Sleep apnea     Uncomplicated asthma       Past Surgical History:   Procedure Laterality Date    ARTHROPLASTY REVISION KNEE Left 3/22/2019    Procedure: LEFT TOTAL KNEE REVISION ARTHROPLASTY;  Surgeon: Yohan Alamo MD;  Location:  OR    ORTHOPEDIC SURGERY  2017    left total knee replacement    ORTHOPEDIC SURGERY      left wrist repair      Allergies   Allergen Reactions    Ambien [Zolpidem] Other (See Comments)     Behavioral disturbances      Social History     Tobacco Use    Smoking status: Former    Smokeless tobacco: Never   Substance Use Topics    Alcohol use: No      Wt Readings from Last 1 Encounters:   23 54 kg (119 lb)        Anesthesia Evaluation   Pt has had prior anesthetic.     History of anesthetic complications  - motion sickness.      ROS/MED HX  ENT/Pulmonary:     (+) sleep apnea,                    Intermittent, asthma                  Neurologic:    (-) no CVA, no TIA and migraines   Cardiovascular:    (-) pacemaker, stent, pacemaker and ICD   METS/Exercise Tolerance: >4 METS    Hematologic:  - neg hematologic  ROS     Musculoskeletal: Comment: Osteopenia  (+)  arthritis,             GI/Hepatic:    (-) GERD   Renal/Genitourinary:    (-) renal disease   Endo:     (+)          thyroid problem, hypothyroidism,           Psychiatric/Substance Use:     (+) psychiatric history anxiety and depression       Infectious Disease:  - neg infectious disease ROS     Malignancy:  - neg malignancy ROS     Other:  - neg other ROS          Physical Exam    Airway        Mallampati: II   TM distance: > 3 FB   Neck ROM:  "full   Mouth opening: > 3 cm    Respiratory Devices and Support         Dental       (+) Modest Abnormalities - crowns, retainers, 1 or 2 missing teeth      Cardiovascular          Rhythm and rate: regular and normal     Pulmonary           breath sounds clear to auscultation           OUTSIDE LABS:  CBC:   Lab Results   Component Value Date    WBC 4.6 12/19/2018    WBC 5.8 09/14/2015    HGB 11.6 (L) 03/24/2019    HGB 11.2 (L) 03/23/2019    HCT 38.2 12/19/2018    HCT 41.5 09/14/2015     12/19/2018     09/14/2015     BMP:   Lab Results   Component Value Date     09/14/2015    POTASSIUM 2.9 (L) 09/14/2015    CHLORIDE 101 09/14/2015    CO2 28 09/14/2015    BUN 15 09/14/2015    CR 0.84 03/22/2019    CR 0.66 09/14/2015    GLC 86 03/24/2019     (H) 09/14/2015     COAGS: No results found for: \"PTT\", \"INR\", \"FIBR\"  POC:   Lab Results   Component Value Date     (H) 03/23/2019     HEPATIC: No results found for: \"ALBUMIN\", \"PROTTOTAL\", \"ALT\", \"AST\", \"GGT\", \"ALKPHOS\", \"BILITOTAL\", \"BILIDIRECT\", \"WILFRED\"  OTHER:   Lab Results   Component Value Date    BART 9.3 09/14/2015    MAG 1.9 09/14/2015    CRP <2.9 12/19/2018    SED 10 12/19/2018       Anesthesia Plan    ASA Status:  2    NPO Status:  NPO Appropriate    Anesthesia Type: General.     - Airway: ETT   Induction: Propofol.   Maintenance: Balanced.        Consents    Anesthesia Plan(s) and associated risks, benefits, and realistic alternatives discussed. Questions answered and patient/representative(s) expressed understanding.     - Discussed:     - Discussed with:  Patient            Postoperative Care    Pain management: IV analgesics.   PONV prophylaxis: Ondansetron (or other 5HT-3), Dexamethasone or Solumedrol, Background Propofol Infusion     Comments:               Jd Kelley MD    I have reviewed the pertinent notes and labs in the chart from the past 30 days and (re)examined the patient.  Any updates or changes from those notes are " reflected in this note.

## 2023-12-28 NOTE — OP NOTE
DATE OF SERVICE:  12/28/2023    SURGEON  DOLORES KIM M.D.    ASSISTANT  Katya Enamorado PA-C - Assisting  KARY Boss, KELLY-C - Assisting    PREOPERATIVE DIAGNOSIS   Right hip osteoarthritis, failed to respond to conservative management.    POSTOPERATIVE DIAGNOSIS   Right hip osteoarthritis, failed to respond to conservative management.    TITLE OF PROCEDURE   Right total hip arthroplasty, Depuy uncemented components, direct anterior approach.    PROCEDURE  The patient was brought to the operating room and after satisfactory anesthesia was placed on the [place] table. The right  lower extremity was then prepped and draped in the usual sterile fashion. Image intensification was utilized during the case for component positioning as well as leg length assessment. An incision was made just lateral to the ASIS and coursing toward the proximal femur. Dissection was carried down to the TFL. The fascia overlying the TFL was incised and dissection was carried down to the interval between TFL and rectus femoris. This was identified. A lateral cobra retractor was placed followed by a medial cobra around the femoral neck. The leash vessels, anterior circumflex, was identified and was coagulated. The underlying fascia was released. Dissection was carried down to the hip capsule. Capsule was identified and a capsulotomy was performed in a T-type fashion first along the intertrochanteric line and then secondarily up along the femoral neck and head, finally completed by releasing along the saddle of the trochanter. The capsular edges were tagged for later repair. The hip was then externally rotated and medial capsular release was performed such that the lesser trochanter could be palpated. Following this, the femoral neck was osteotomized as per the preoperative plan. The femoral head was removed with a corkscrew without difficulty. The acetabulum was exposed and was reamed sequentially up to 52 mm. This was reamed under  both direct visualization as well as the aid of image intensification. A 52 mm [place] cup was impacted into place in approximately 40 to 45 degrees of abduction, and 20 degrees of anteversion. Two screws were placed and this gave excellent fixation. The 36 mm neutral liner was impacted into place.     Attention was then directed to the femur. The hook was placed underneath the proximal aspect of the femur between the trochanteric ridge and gluteus bette. The hip was then brought into external rotation, adduction, and extension. The femur was then carefully elevated using the appropriate releases off the inside of the greater trochanter. Once the femur was elevated, a starter broach was placed followed by sequential broaches. The broaches were performed up to size 4 Actis, which gave excellent torsinal as well as axial stability. Trial reduction was performed with a standard offset +5mm head. The hip was reduced and with hip reduction the combined anteversion looked excellent. The hip was brought into full extension and external rotation. There was no evidence of instability. As well, x-rays were printed and compared with the opposite side and found to have good length and restoration of offset.  It was felt that an additional stem size could not be placed.   The hip was then dislocated and then the proximal femur was then brought back up into the proximal aspect of the wound. The real size 4 actis stem, standard offset was impacted into place. Again this gave excellent torsion as well as axial stability. The real +5mm ceramic biolox head was impacted into place and the hip was reduced again. The image intensification confirmed excellent position of the components. The wound was thoroughly irrigated. One gram of vancomycin powder was placed deep and superficial prior to closure.  The capsule was closed with interrupted 0 Vicryl suture and tissues infiltrated with toradol/marcaine mixture. The tensor fascia was  closed with a running 0 Stratafix suture. The subcutaneous layer was closed with interrupted 2-0 Vicryl, 2-0 Stratafix, and 3-0 subcuticular monocryl was placed followed by a mesh dressing with skin glue. Sterile dressing was applied. The patient left the operating room in satisfactory condition. Patient received 1 gm of tranexamic acid pre-op.    A skilled first assistant was necessary for this procedure for assistance with patient positioning, prepping, draping, surgical visualization, performance of the repair, wound closure, and application of the dressing.       25-Oct-2018

## 2023-12-28 NOTE — CONSULTS
[facility]  Consult Note - Hospitalist Service  Date of Admission:  12/28/2023  Consult Requested by: Orthopedics  Reason for Consult: Co-medical management      Assessment & Plan   Niecy Toro is a 68 year old female admitted on 12/28/2023 status post right total hip arthroplasty. Hospitalist service consulted for medication co-management.    S/p right total hip arthroplasty 12/28  Doing well post-operatively. Pain currently well controlled.   - Defer pain management to primary team  - Defer VTE prophylaxis to primary team  - PT/OT    Urinary retention  Has not been able to urinate after surgery yet x2.   - Straight cath prn, monitor for resolution of retention     Hypothyroidism   - Continue levothyroxine 150 mcg every morning, she took it before procedure today     Sinus Bradycardia   Received prn labetalol in the PACU. Currently asymptomatic. HR between 55-65. Would prefer prn hydralazine for prn hypertension medication.  - EKG to check QT      Hypertension  Has post-operative hypertension. Received labetalol in the PACU  - prn hydralazine for systolic BP > 180     Mild intermittent asthma  - prn pta albuterol     Hyperlipidemia   - Continue simvastatin 10 mg every evening     MDD  Anxiety  - Continue PTA Duloxetine. She took it before procedure today        Clinically Significant Risk Factors Present on Admission                                  Patrick Franco DO  Hospitalist Service  Securely message with [employer] (more info)  Text page via [facility] Paging/Directory   ______________________________________________________________________    Chief Complaint   S/p right NICOLASA     History is obtained from the patient    History of Present Illness   Niecy Toro is a 68 year old female with past medical history of anxiety, depression, hypothyroidism, and insomnia presents for monitoring after right total hip arthroplasty.    Overall the patient reports to be doing well. She does not have  any pain. Has been taking all of her PTA medications as prescribed. Denies any recent fevers, chills, cough, chest pain, nausea, vomiting, shortness of breath, dizziness, lightheadedness. No acute concerns.     Past Medical History    Past Medical History:   Diagnosis Date    Anxiety     Arthritis     Depression     Depressive disorder     Hyperlipidemia     Hypothyroidism     Insomnia     Motion sickness     Osteopenia     Sleep apnea     Uncomplicated asthma      Past Surgical History   Past Surgical History:   Procedure Laterality Date    ARTHROPLASTY REVISION KNEE Left 3/22/2019    Procedure: LEFT TOTAL KNEE REVISION ARTHROPLASTY;  Surgeon: Yohan Alamo MD;  Location:  OR    ORTHOPEDIC SURGERY  09/11/2017    left total knee replacement    ORTHOPEDIC SURGERY      left wrist repair     Medications   I have reviewed this patient's current medications     Review of Systems    The 10 point Review of Systems is negative other than noted in the HPI or here.     Social History   I have reviewed this patient's social history and updated it with pertinent information if needed.  Social History     Tobacco Use    Smoking status: Former    Smokeless tobacco: Never   Substance Use Topics    Alcohol use: No    Drug use: No     Family History     No significant family history reported       Allergies   Allergies   Allergen Reactions    Ambien [Zolpidem] Other (See Comments)     Behavioral disturbances        Physical Exam   Vital Signs: Temp: 97.4  F (36.3  C) Temp src: Axillary BP: (!) 147/76 Pulse: 62   Resp: 14 SpO2: 96 % O2 Device: Nasal cannula Oxygen Delivery: 3 LPM  Weight: 119 lbs 0 oz    General Appearance: Adult female in no acute distress  Eyes: PERRLA  HEENT: Normocephalic, atraumatic   Respiratory: Clear to auscultation, no wheezes, crackles, or rales  Cardiovascular: Regular rhythm, HR ~60 bpm, no murmurs, rubs, or gallops  GI: Soft, non-distended, non-tender, bowel sounds present  Skin: Visualized skin is  clear  Musculoskeletal: Right leg in bandage, no lower extremity edema. Warm distal extremities  Neurologic: Alert and oriented x4, moving all extremities, CN 2-12 inact  Psychiatric: Normal affect     Medical Decision Making       70 MINUTES SPENT BY ME on the date of service doing chart review, history, exam, documentation & further activities per the note.      Data     I have personally reviewed the following data over the past 24 hrs:    N/A  \   N/A   / N/A     N/A N/A N/A /  100 (H)   4.1 N/A 0.79 \       Imaging results reviewed over the past 24 hrs:   Recent Results (from the past 24 hour(s))   XR Surgery LAURA L/T 5 Min Fluoro w Stills    Narrative    XR SURGERY LAURA FLUORO LESS THAN 5 MIN W STILLS 12/28/2023 9:33 AM     HISTORY: right total hip direct anterior. sks. c-arm #2. 49 sec fluoro  time. 11 images. OR 32. [id number]    NUMBER OF IMAGES ACQUIRED: 11    FLUOROSCOPY TIME: 0.9 minute(s)      Impression    IMPRESSION: Intraoperative images obtained demonstrate placement of a  right total hip arthroplasty.    TRACY SINGLETON MD         SYSTEM ID:  [id number]   XR Pelvis w Hip Port Right 1 View    Narrative    XR PELVIS AND HIP PORTABLE RIGHT 1 VIEW 12/28/2023 11:35 AM     HISTORY: Status post Hip surgery    COMPARISON: Intraoperative views from today      Impression    IMPRESSION: Right NICOLASA. Postoperative air is present. Components are  well seated. No fractures are evident.    DIANNA STEPHENS MD         SYSTEM ID:  [id number]     Patrick Franco DO

## 2023-12-28 NOTE — PLAN OF CARE
Goal Outcome Evaluation:       Patient vital signs are at baseline: No,  Reason:  HTN mild. Asymptomatic  Patient able to ambulate as they were prior to admission or with assist devices provided by therapies during their stay:  Yes  Patient MUST void prior to discharge:  No,  Reason:  due to void  Patient able to tolerate oral intake:  Yes  Pain has adequate pain control using Oral analgesics:  Yes  Does patient have an identified :  Yes  Has goal D/C date and time been discussed with patient:  Yes

## 2023-12-29 ENCOUNTER — APPOINTMENT (OUTPATIENT)
Dept: PHYSICAL THERAPY | Facility: CLINIC | Age: 68
End: 2023-12-29
Attending: ORTHOPAEDIC SURGERY
Payer: COMMERCIAL

## 2023-12-29 VITALS
HEART RATE: 69 BPM | BODY MASS INDEX: 21.9 KG/M2 | HEIGHT: 62 IN | RESPIRATION RATE: 16 BRPM | DIASTOLIC BLOOD PRESSURE: 81 MMHG | WEIGHT: 119 LBS | OXYGEN SATURATION: 96 % | SYSTOLIC BLOOD PRESSURE: 114 MMHG | TEMPERATURE: 98.1 F

## 2023-12-29 LAB
ATRIAL RATE - MUSE: 53 BPM
DIASTOLIC BLOOD PRESSURE - MUSE: NORMAL MMHG
INTERPRETATION ECG - MUSE: NORMAL
P AXIS - MUSE: 64 DEGREES
PR INTERVAL - MUSE: 146 MS
QRS DURATION - MUSE: 76 MS
QT - MUSE: 476 MS
QTC - MUSE: 446 MS
R AXIS - MUSE: 38 DEGREES
SYSTOLIC BLOOD PRESSURE - MUSE: NORMAL MMHG
T AXIS - MUSE: 61 DEGREES
VENTRICULAR RATE- MUSE: 53 BPM

## 2023-12-29 PROCEDURE — 250N000011 HC RX IP 250 OP 636: Performed by: ORTHOPAEDIC SURGERY

## 2023-12-29 PROCEDURE — 97116 GAIT TRAINING THERAPY: CPT | Mod: GP,CQ | Performed by: PHYSICAL THERAPY ASSISTANT

## 2023-12-29 PROCEDURE — 97110 THERAPEUTIC EXERCISES: CPT | Mod: GP,CQ | Performed by: PHYSICAL THERAPY ASSISTANT

## 2023-12-29 PROCEDURE — 250N000013 HC RX MED GY IP 250 OP 250 PS 637: Performed by: STUDENT IN AN ORGANIZED HEALTH CARE EDUCATION/TRAINING PROGRAM

## 2023-12-29 PROCEDURE — 97530 THERAPEUTIC ACTIVITIES: CPT | Mod: GP,CQ | Performed by: PHYSICAL THERAPY ASSISTANT

## 2023-12-29 PROCEDURE — 250N000013 HC RX MED GY IP 250 OP 250 PS 637: Performed by: ORTHOPAEDIC SURGERY

## 2023-12-29 RX ADMIN — KETOROLAC TROMETHAMINE 15 MG: 15 INJECTION, SOLUTION INTRAMUSCULAR; INTRAVENOUS at 05:50

## 2023-12-29 RX ADMIN — ASPIRIN 325 MG: 325 TABLET, COATED ORAL at 08:22

## 2023-12-29 RX ADMIN — ACETAMINOPHEN 975 MG: 325 TABLET, FILM COATED ORAL at 05:50

## 2023-12-29 RX ADMIN — DULOXETINE HYDROCHLORIDE 60 MG: 60 CAPSULE, DELAYED RELEASE ORAL at 08:21

## 2023-12-29 RX ADMIN — DOCUSATE SODIUM 50 MG AND SENNOSIDES 8.6 MG 1 TABLET: 8.6; 5 TABLET, FILM COATED ORAL at 08:22

## 2023-12-29 RX ADMIN — LEVOTHYROXINE SODIUM 150 MCG: 150 TABLET ORAL at 05:51

## 2023-12-29 ASSESSMENT — ACTIVITIES OF DAILY LIVING (ADL)
ADLS_ACUITY_SCORE: 22
ADLS_ACUITY_SCORE: 24
ADLS_ACUITY_SCORE: 22

## 2023-12-29 NOTE — PROGRESS NOTES
VSS, CMS intact. Up with 1 and walker. Pain managed with toradol and tylenol. Dressing C/D/I. Reviewed AVS with teach back. Discharge medications and instruction sheets given. A&OX4. Left floor via wheelchair.

## 2023-12-29 NOTE — PLAN OF CARE
PT-  Pt discharged home today with assist of spouse as needed.  No further PT planned at this time.  PT goals met.

## 2023-12-29 NOTE — PLAN OF CARE
Goal Outcome Evaluation:         Patient vital signs are at baseline: Yes  Patient able to ambulate as they were prior to admission or with assist devices provided by therapies during their stay:  Yes  Patient MUST void prior to discharge:  Yes  Patient able to tolerate oral intake:  Yes  Pain has adequate pain control using Oral analgesics:  Yes  Does patient have an identified :  Yes  Has goal D/C date and time been discussed with patient:  Yes       Alert and oriented x 4. CMS intact. Dressing CDI. PIV infusing  ml/hr. Pt is SBA using GBW. Uses CPAP at bedtime.

## 2023-12-29 NOTE — PROGRESS NOTES
"Orthopedic Surgery  12/29/2023  POD #1    Ms. Toro is doing well status post right total hip arthroplasty. Patient voices no complaints today. Pain is well controlled. She was straight cathed last night, but now voiding on her own.     Vitals:   Blood pressure 114/81, pulse 69, temperature 98.1  F (36.7  C), temperature source Axillary, resp. rate 16, height 1.562 m (5' 1.5\"), weight 54 kg (119 lb), SpO2 96%.    Labs:  Lab Results   Component Value Date    HGB 11.6 03/24/2019       Exam:  Neurovascularly intact.  Calves are soft and non-tender bilaterally.  The foot is warm with normal capillary refill.  The dressing is C/D/I.    Assessment:  Ms. Toro is doing well status post right total hip arthroplasty.    Plan:   1. No dressing change, patient to remove outer dressing on POD3, leaving mesh adhesive in place for 4 weeks.  2. Mobilize and continue physical therapy.   3. DVT prophylaxis with Aspirin 325mg every day x 6 weeks  4. Pain control with Celebrex, Tylenol, and Oxycodone.  5. Discharge to home today.    Katya Enamorado PA-C  [phone]        "

## 2023-12-29 NOTE — PROGRESS NOTES
[facility]    Medicine Progress Note - Hospitalist Service    Date of Admission:  12/28/2023    Assessment & Plan    Niecy Toro is a 68 year old female admitted on 12/28/2023 status post right total hip arthroplasty. Hospitalist service consulted for medication co-management.     S/p right total hip arthroplasty 12/28  Doing well post-operatively. Pain currently well controlled.   - discharging home per orthopedics surgery, defer dvt prophy, pain control and therapies to ortho     Urinary retention-resolved  Initially difficulty urinate after surgery. This has since resolved and urinating on her own.      Hypothyroidism   - Continue levothyroxine 150 mcg every morning     Sinus Bradycardia- resolved  Received prn labetalol in the PACU. Currently asymptomatic. HR between 55-65. Would prefer prn hydralazine for prn hypertension medication.     Elevated blood pressure-resolved  Has post-operative hypertension. Received labetalol in the PACU  -bp currently controlled without meds     Mild intermittent asthma  - prn pta albuterol      Hyperlipidemia   - Continue simvastatin 10 mg every evening      MDD  Anxiety  - Continue PTA Duloxetine          Diet: Advance Diet as Tolerated: Regular Diet Adult  Discharge Instruction - Regular Diet Adult    DVT Prophylaxis: Defer to primary service  Javier Catheter: Not present  Lines: None     Cardiac Monitoring: None  Code Status: Full Code      Clinically Significant Risk Factors Present on Admission                                  Disposition Plan  per ortho. Ok to discharge from medicine stand point     Expected Discharge Date: 12/29/2023,  9:00 AM    Destination: home with family              Piocitlalli Riky Johnston MD  Hospitalist Service  [facility]  Securely message with [employer] (more info)  Text page via [employer] Paging/Directory   ______________________________________________________________________    Interval History    Patient reports she is doing well. Pain is controlled. Denies n/v or abdominal pain. She is discharging home today per orthopedics.    Physical Exam   Vital Signs: Temp: 98.1  F (36.7  C) Temp src: Axillary BP: 114/81 Pulse: 69   Resp: 16 SpO2: 96 % O2 Device: None (Room air) Oxygen Delivery: 3 LPM  Weight: 119 lbs 0 oz    General Appearance: Alert, awake and no apparent distress  Respiratory: CTA  Cardiovascular:RRR  GI: soft      Medical Decision Making       15 MINUTES SPENT BY ME on the date of service doing chart review, history, exam, documentation & further activities per the note.  MANAGEMENT DISCUSSED with the following over the past 24 hours: patient and RN   NOTE(S)/MEDICAL RECORDS REVIEWED over the past 24 hours: ortho note       Data

## 2023-12-29 NOTE — PLAN OF CARE
OT: Order received, chart reviewed and discussed with care team. Per PT, patient was independent in room on arrival. Patient was able to dress self and had no acute care OT needs.  Defer discharge recommendations to PT and care team. Will complete OT orders.

## 2023-12-29 NOTE — PLAN OF CARE
Goal Outcome Evaluation:    Date/Time: 2300-7:30    Trauma/Ortho    Diagnosis: S/p right total hip arthroplasty 12/28     POD# 1  Mental Status: A&Ox4  Activity/dangle: SBA  Diet: Regular  Pain: Toradol  Javier/Voiding: BR  Tele/Restraints/Iso: NA  02/LDA: RA, CPAP at night  D/C Date: 12/29  Other Info: PIV-SL, wt bearing as tolerated, ambulating and voiding well. Will continue to monitor.

## 2024-02-02 ENCOUNTER — OFFICE VISIT (OUTPATIENT)
Dept: FAMILY MEDICINE | Facility: CLINIC | Age: 69
End: 2024-02-02

## 2024-02-02 VITALS
WEIGHT: 121 LBS | HEIGHT: 62 IN | SYSTOLIC BLOOD PRESSURE: 116 MMHG | HEART RATE: 68 BPM | BODY MASS INDEX: 22.26 KG/M2 | TEMPERATURE: 98 F | DIASTOLIC BLOOD PRESSURE: 70 MMHG | OXYGEN SATURATION: 97 %

## 2024-02-02 DIAGNOSIS — U07.1 INFECTION DUE TO 2019 NOVEL CORONAVIRUS: Primary | ICD-10-CM

## 2024-02-02 DIAGNOSIS — J02.9 SORE THROAT: ICD-10-CM

## 2024-02-02 DIAGNOSIS — H61.21 IMPACTED CERUMEN OF RIGHT EAR: ICD-10-CM

## 2024-02-02 DIAGNOSIS — R05.1 ACUTE COUGH: ICD-10-CM

## 2024-02-02 LAB
COVID-19: POSITIVE
STREP A: NEGATIVE

## 2024-02-02 PROCEDURE — 87635 SARS-COV-2 COVID-19 AMP PRB: CPT

## 2024-02-02 PROCEDURE — 87651 STREP A DNA AMP PROBE: CPT

## 2024-02-02 PROCEDURE — 99203 OFFICE O/P NEW LOW 30 MIN: CPT

## 2024-02-02 NOTE — NURSING NOTE
Chief Complaint   Patient presents with    New Patient     New patient to our clinic, previously seeing Riolizeth     ALAN     Symptoms started 3 days ago, ear pain, sore throat, congestion, cough, she feels cough is moving into her chest      Pre-visit Screening:  Immunizations:  not up to date - shingrix at pharmacy  Colonoscopy:  unknown- will get records  Mammogram: unknown-will get records  Asthma Action Test/Plan:  NA  PHQ9:  NA  GAD7:  NA  Questioned patient about current smoking habits Pt. quit smoking some time ago.  Ok to leave detailed message on voice mail for today's visit only Yes, phone # [phone]

## 2024-02-02 NOTE — PROGRESS NOTES
Assessment & Plan     1. Infection due to 2019 novel coronavirus  - COVID positive. Discussed options with Niecy of continuing symptomatic management for her symptoms and/or starting Paxlovid. She would like to hold off on Paxlovid as she does not feel as though her symptoms are too bad. She has had COVID once in the past a couple of years ago and denies any complications/hospitalizations. She has never been on Paxlovid in the past. Recommend she continue to rest, push plenty of fluids, tea with honey, alternate Tylenol and Ibuprofen, Mucinex and/or Robitussin DM for cough, salt water gargles for sore throat, etc. Discussed if her symptoms worsen and she is interested in starting Paxlovid, she would need to start the medication by Sunday (2/4/24) at the latest, she will keep me updated on this. Return to clinic and ER precautions discussed.     2. Acute cough  - See above.   - COVID-19 ([facility])    3. Sore throat  - See above, strep negative.   - Strep A ([facility])    4. Impacted cerumen of right ear  - Recommend Debrox OTC 5 drops in right ear with a cotton ball before bed each night for the next week and if not draining Niecy will plan to come in to get right ear washed.     Follow up as needed. Reasons to follow-up sooner or seek emergent care reviewed.     Marina Kyle PA-C  [facility]       Subjective     Niecy Toro is a 68 year old female who presents to clinic today for the following health issues:    HPI   Chief Complaint   Patient presents with     New Patient     New patient to our clinic, previously seeing Mabel PHILLIPS     Symptoms started 3 days ago, ear pain, sore throat, congestion, cough, she feels cough is moving into her chest       Niecy is a new patient to the clinic who presents with concerns of a sore throat and cough. She is a new patient to the clinic who will be transitioning to us from [facility]. She was previously seeing Dr. Junior at [facility] in [place], records  "requested.    Niecy states her symptoms started three days ago (01/30/24) with a sore throat, bilateral ear pain, nasal congestion, and a cough. She notes she thinks her cough could be productive but she is not able to get any phlegm to come up. She denies any fevers/chills, body aches, shortness of breath, wheezing, chest pain, abdominal pain, nausea, vomiting, diarrhea, etc. She just had a right hip replacement on 12/28/24 and notes she is doing welll; no signs of infection over incision. She denies being around anyone ill other than her  recently had a cold. She has been taking Mucinex for her symptoms. She has not done any at home COVID tests.     Past medical history and daily medications reviewed by me.       Objective    /70 (BP Location: Right arm, Patient Position: Sitting, Cuff Size: Adult Large)   Pulse 68   Temp 98  F (36.7  C) (Temporal)   Ht 1.568 m (5' 1.75\")   Wt 54.9 kg (121 lb)   SpO2 97%   BMI 22.31 kg/m    Body mass index is 22.31 kg/m .    Physical Examination:  GENERAL: healthy, alert and no distress  EYES: Eyes grossly normal to inspection, PERRL and conjunctivae and sclerae normal  HENT: right ear canal obstructed with cerumen, unable to visualize TM, left ear canal and TM normal, congestion present, mild posterior oropharyngeal erythema present, mouth without ulcers or lesions  NECK: anterior cervical and submandibular lymphadenopathy present bilaterally  RESP: lungs clear to auscultation - no rales, rhonchi or wheezes  CV: regular rate and rhythm, normal S1 S2, no S3 or S4, no murmur, click or rub, no peripheral edema   ABDOMEN: soft, nontender, no masses  MS: no gross musculoskeletal defects noted, no edema  SKIN: no suspicious lesions or rashes  NEURO: Normal strength and tone, mentation intact and speech normal  PSYCH: mentation appears normal, affect normal/bright    Labs reviewed.  Results for orders placed or performed in visit on 02/02/24 (from the past 24 " hour(s))   COVID-19 ([facility])   Result Value Ref Range    COVID-19 Positive (A)    Strep A ([facility])   Result Value Ref Range    STREP A Negative Negative

## 2024-02-23 ENCOUNTER — OFFICE VISIT (OUTPATIENT)
Dept: FAMILY MEDICINE | Facility: CLINIC | Age: 69
End: 2024-02-23

## 2024-02-23 ENCOUNTER — TRANSFERRED RECORDS (OUTPATIENT)
Dept: FAMILY MEDICINE | Facility: CLINIC | Age: 69
End: 2024-02-23

## 2024-02-23 VITALS
OXYGEN SATURATION: 96 % | DIASTOLIC BLOOD PRESSURE: 76 MMHG | HEART RATE: 61 BPM | BODY MASS INDEX: 22.31 KG/M2 | SYSTOLIC BLOOD PRESSURE: 118 MMHG | WEIGHT: 121 LBS | TEMPERATURE: 98.4 F

## 2024-02-23 DIAGNOSIS — H61.21 IMPACTED CERUMEN OF RIGHT EAR: ICD-10-CM

## 2024-02-23 DIAGNOSIS — J01.90 ACUTE NON-RECURRENT SINUSITIS, UNSPECIFIED LOCATION: Primary | ICD-10-CM

## 2024-02-23 PROCEDURE — 69209 REMOVE IMPACTED EAR WAX UNI: CPT | Mod: RT

## 2024-02-23 PROCEDURE — 99213 OFFICE O/P EST LOW 20 MIN: CPT

## 2024-02-23 RX ORDER — AZITHROMYCIN 250 MG/1
TABLET, FILM COATED ORAL
Qty: 6 TABLET | Refills: 0 | Status: SHIPPED | OUTPATIENT
Start: 2024-02-23 | End: 2024-02-28

## 2024-02-23 NOTE — PROGRESS NOTES
Assessment & Plan     1. Acute non-recurrent sinusitis, unspecified location  - Discussed with Hailee her symptoms and physical exam are consistent with a sinus infection. RX for Azithromycin sent to pharmacy on file. Discussed how to take this medication and with food to prevent GI upset. Also encouraged her to continue to rest, push plenty of fluids, start netti pot twice daily, continue Flonase, Sudafed for congestion, etc. Return to clinic and ER precautions discussed.    - azithromycin (ZITHROMAX) 250 MG tablet; Take 2 tablets (500 mg) by mouth daily for 1 day, THEN 1 tablet (250 mg) daily for 4 days.  Dispense: 6 tablet; Refill: 0    2. Impacted cerumen of right ear  - Ear wash removal was attempted for right ear however patient experienced significant discomfort during procedure so was not able to remove all of the cerumen and skin build up. Patient will plan to see ENT on 03/05/24 for further management.   - Removal Impacted Cerumen Irrigation Unilateral (Ear Wash)    Follow up as needed. Reasons to follow-up sooner or seek emergent care reviewed.     Marina Kyle PA-C  [facility]       Subjective     Niecy Toro is a 68 year old female who presents to clinic today for the following health issues:    HPI   Chief Complaint   Patient presents with     Ear Problem     Bilateral ear fullness, crackling in her ears, has been using OTC drops     Sinus Problem     Sinu congestion, fullness in her head, did test positive for covid on 02/02      Hailee presents with sinus concerns. She was seen on 02/02/24 for an illness and tested positive to COVID. She notes since then most of her symptoms resolved except she has continued to have sinus congestion with yellow drainage, head and ear fullness, and pressure over her cheeks. She notes she has been using hydrogen peroxide drops for her ears to see if it is ear wax however she has not been able to get any wax to come out of her ears. She notes she  does have some ear pain that comes and goes but overall her main concern is the ear fullness, crackling, and decreased hearing. She denies any ear drainage. She is scheduled to see ENT on 03/05/24 for her ears. She has a history of sinus infections. She denies any symptoms of fevers/chills, body aches, sore throat, chest pain, difficulty breathing, or wheezing. She has been taking Flonase tablets for her symptoms which have been helping. She does have a netti pot but has not been using this. She denies any GI symptoms.     Daily medications reviewed by me.       Objective    /76 (BP Location: Right arm, Patient Position: Sitting, Cuff Size: Adult Regular)   Pulse 61   Temp 98.4  F (36.9  C) (Temporal)   Wt 54.9 kg (121 lb)   SpO2 96%   BMI 22.31 kg/m    Body mass index is 22.31 kg/m .    Physical Examination:  GENERAL: healthy, alert and no distress  EYES: Eyes grossly normal to inspection, PERRL and conjunctivae and sclerae normal  HENT: right ear canal partially obstructed with cerumen and skin buildup, left ear canal clear, TM's normal bilaterally, congestion with yellow drainage present bilaterally, fullness noted over maxillary sinuses bilaterally, and mouth without ulcers or lesions  NECK: no adenopathy, no asymmetry, masses, or scars and thyroid normal to palpation  RESP: lungs clear to auscultation - no rales, rhonchi or wheezes  CV: regular rate and rhythm, normal S1 S2, no S3 or S4, no murmur, click or rub, no peripheral edema   ABDOMEN: soft and non-tender  MS: no gross musculoskeletal defects noted, no edema  SKIN: no suspicious lesions or rashes  NEURO: Normal strength and tone, mentation intact and speech normal  PSYCH: mentation appears normal, affect normal/bright

## 2024-02-23 NOTE — PATIENT INSTRUCTIONS
Sinus infection    -Start Azithromycin. Take with food and a probiotic such as a yogurt daily    -Start using a Netti Pot and continue with the Flonase    -Please seen ENT for the ears.     Let me know if your symptoms do not improve and/or worsen.

## 2024-02-23 NOTE — NURSING NOTE
Chief Complaint   Patient presents with    Ear Problem     Bilateral ear fullness, crackling in her ears, has been using OTC drops    Sinus Problem     Sinu congestion, fullness in her head, did test positive for covid on 02/02     Pre-visit Screening:  Immunizations:  not up to date - shingrix at pharmacy, prevnar 20 when feeling better  Colonoscopy:  is up to date  Mammogram: is up to date  Asthma Action Test/Plan:  NA  PHQ9:  NA  GAD7:  NA  Questioned patient about current smoking habits Pt. has never smoked.  Ok to leave detailed message on voice mail for today's visit only Yes, phone # [phone]

## 2024-05-06 ENCOUNTER — OFFICE VISIT (OUTPATIENT)
Dept: URGENT CARE | Facility: CLINIC | Age: 69
End: 2024-05-06
Payer: MEDICARE

## 2024-05-06 VITALS
RESPIRATION RATE: 20 BRPM | DIASTOLIC BLOOD PRESSURE: 80 MMHG | HEART RATE: 75 BPM | SYSTOLIC BLOOD PRESSURE: 128 MMHG | OXYGEN SATURATION: 94 %

## 2024-05-06 DIAGNOSIS — R39.89 SENSATION OF PRESSURE IN BLADDER AREA: ICD-10-CM

## 2024-05-06 DIAGNOSIS — R30.0 DYSURIA: Primary | ICD-10-CM

## 2024-05-06 DIAGNOSIS — N89.8 VAGINAL IRRITATION: ICD-10-CM

## 2024-05-06 DIAGNOSIS — R39.15 URGENCY OF URINATION: ICD-10-CM

## 2024-05-06 PROBLEM — Z86.0100 HISTORY OF COLON POLYPS: Status: ACTIVE | Noted: 2020-12-14

## 2024-05-06 PROBLEM — F32.A DEPRESSION: Status: ACTIVE | Noted: 2024-05-06

## 2024-05-06 PROBLEM — Z96.641 STATUS POST RIGHT HIP REPLACEMENT: Status: ACTIVE | Noted: 2023-12-28

## 2024-05-06 PROBLEM — F51.01 INSOMNIA, IDIOPATHIC: Status: ACTIVE | Noted: 2017-06-05

## 2024-05-06 PROBLEM — G47.00 INSOMNIA: Status: ACTIVE | Noted: 2024-05-06

## 2024-05-06 PROBLEM — Z80.0 FAMILY HISTORY OF COLON CANCER: Status: ACTIVE | Noted: 2020-10-12

## 2024-05-06 PROBLEM — F41.9 ANXIETY: Status: ACTIVE | Noted: 2024-05-06

## 2024-05-06 PROBLEM — Z96.652 S/P REVISION OF TOTAL KNEE, LEFT: Status: ACTIVE | Noted: 2019-03-22

## 2024-05-06 LAB
ALBUMIN SERPL-MCNC: 4.8 G/DL (ref 3.5–5.3)
ALP SERPL-CCNC: 70 U/L (ref 55–142)
ALT SERPL-CCNC: 13 U/L (ref 7–52)
ANION GAP SERPL CALC-SCNC: 9 MMOL/L (ref 3–11)
AST SERPL-CCNC: 18 U/L
BACTERIA #/AREA URNS HPF: NORMAL /HPF
BACTERIAL VAGINOSIS: NEGATIVE
BASOPHILS # BLD AUTO: 0 10*3/UL
BASOPHILS NFR BLD AUTO: 0.6 % (ref 0–2)
BILIRUB SERPL-MCNC: 0.38 MG/DL (ref 0.2–1.4)
BILIRUB UR QL: NEGATIVE
BUN SERPL-MCNC: 18 MG/DL (ref 7–25)
C TRACH DNA SPEC QL NAA+PROBE: NEGATIVE
CALCIUM ALBUM COR SERPL-MCNC: 9.4 MG/DL (ref 8.6–10.3)
CALCIUM SERPL-MCNC: 10 MG/DL (ref 8.6–10.3)
CANDIDA GLABRATA / CANDIDA KRUSEI: NEGATIVE
CANDIDA GROUP: NEGATIVE
CHLORIDE SERPL-SCNC: 101 MMOL/L (ref 98–107)
CLARITY UR: CLEAR
CO2 SERPL-SCNC: 29 MMOL/L (ref 21–32)
COLOR UR: YELLOW
CREAT SERPL-MCNC: 0.9 MG/DL (ref 0.6–1.1)
CRP SERPL-MCNC: <1 MG/L
EGFRCR SERPLBLD CKD-EPI 2021: 70 ML/MIN/1.73M*2
EOSINOPHIL # BLD AUTO: 0.1 10*3/UL
EOSINOPHIL NFR BLD AUTO: 1 % (ref 0–3)
ERYTHROCYTE [DISTWIDTH] IN BLOOD BY AUTOMATED COUNT: 13.4 % (ref 11.5–14)
ERYTHROCYTE [SEDIMENTATION RATE] IN BLOOD: 9 MM/HR
GLUCOSE SERPL-MCNC: 87 MG/DL (ref 70–105)
GLUCOSE UR QL: NEGATIVE MG/DL
HCT VFR BLD AUTO: 40.5 % (ref 34–45)
HGB BLD-MCNC: 13.3 G/DL (ref 11.5–15.5)
HGB UR QL: ABNORMAL
KETONES UR-MCNC: NEGATIVE MG/DL
LEUKOCYTE ESTERASE UR QL STRIP: NEGATIVE
LYMPHOCYTES # BLD AUTO: 0.9 10*3/UL
LYMPHOCYTES NFR BLD AUTO: 18.4 % (ref 11–47)
MCH RBC QN AUTO: 30.5 PG (ref 28–33)
MCHC RBC AUTO-ENTMCNC: 32.9 G/DL (ref 32–36)
MCV RBC AUTO: 92.7 FL (ref 81–97)
MONOCYTES # BLD AUTO: 0.4 10*3/UL
MONOCYTES NFR BLD AUTO: 7.9 % (ref 3–11)
N GONORRHOEA DNA SPEC QL NAA+PROBE: NEGATIVE
NEUTROPHILS # BLD AUTO: 3.7 10*3/UL
NEUTROPHILS NFR BLD AUTO: 72.1 % (ref 41–81)
NITRITE UR QL: NEGATIVE
PH UR: 5.5 PH
PLATELET # BLD AUTO: 205 10*3/UL (ref 140–350)
PMV BLD AUTO: 8.1 FL (ref 6.9–10.8)
POTASSIUM SERPL-SCNC: 4 MMOL/L (ref 3.5–5.1)
PROT SERPL-MCNC: 7.6 G/DL (ref 6–8.3)
PROT UR STRIP-MCNC: NEGATIVE MG/DL
RBC # BLD AUTO: 4.37 10*6/ΜL (ref 3.7–5.3)
RBC #/AREA URNS HPF: NORMAL /HPF
SODIUM SERPL-SCNC: 139 MMOL/L (ref 135–145)
SP GR UR: 1.01 (ref 1–1.03)
SQUAMOUS #/AREA URNS HPF: NORMAL /HPF
T VAGINALIS DNA VAG QL PROBE+SIG AMP: NEGATIVE
UROBILINOGEN UR-MCNC: 0.2 MG/DL
WBC # BLD AUTO: 5.1 10*3/UL (ref 4.5–10.5)
WBC #/AREA URNS HPF: NORMAL /HPF

## 2024-05-06 PROCEDURE — 87491 CHLMYD TRACH DNA AMP PROBE: CPT | Performed by: NURSE PRACTITIONER

## 2024-05-06 PROCEDURE — 36415 COLL VENOUS BLD VENIPUNCTURE: CPT | Performed by: NURSE PRACTITIONER

## 2024-05-06 PROCEDURE — 0352U MULTIPLEX VAGINAL PANEL PCR: CPT | Performed by: NURSE PRACTITIONER

## 2024-05-06 PROCEDURE — 85652 RBC SED RATE AUTOMATED: CPT | Performed by: NURSE PRACTITIONER

## 2024-05-06 PROCEDURE — 80053 COMPREHEN METABOLIC PANEL: CPT | Performed by: NURSE PRACTITIONER

## 2024-05-06 PROCEDURE — 81001 URINALYSIS AUTO W/SCOPE: CPT | Performed by: NURSE PRACTITIONER

## 2024-05-06 PROCEDURE — 99204 OFFICE O/P NEW MOD 45 MIN: CPT | Mod: GF | Performed by: NURSE PRACTITIONER

## 2024-05-06 PROCEDURE — 87591 N.GONORRHOEAE DNA AMP PROB: CPT | Performed by: NURSE PRACTITIONER

## 2024-05-06 PROCEDURE — 85025 COMPLETE CBC W/AUTO DIFF WBC: CPT | Performed by: NURSE PRACTITIONER

## 2024-05-06 PROCEDURE — G0463 HOSPITAL OUTPT CLINIC VISIT: HCPCS | Performed by: NURSE PRACTITIONER

## 2024-05-06 PROCEDURE — 86140 C-REACTIVE PROTEIN: CPT | Performed by: NURSE PRACTITIONER

## 2024-05-06 RX ORDER — NITROFURANTOIN 25; 75 MG/1; MG/1
100 CAPSULE ORAL 2 TIMES DAILY
Qty: 10 CAPSULE | Refills: 0 | Status: SHIPPED | OUTPATIENT
Start: 2024-05-06 | End: 2024-05-06 | Stop reason: ALTCHOICE

## 2024-05-06 RX ORDER — DULOXETIN HYDROCHLORIDE 60 MG/1
60 CAPSULE, DELAYED RELEASE ORAL DAILY
COMMUNITY

## 2024-05-06 RX ORDER — FLUTICASONE PROPIONATE 50 MCG
1 SPRAY, SUSPENSION (ML) NASAL DAILY
COMMUNITY
Start: 2024-03-05

## 2024-05-06 RX ORDER — AZITHROMYCIN 250 MG/1
250 TABLET, FILM COATED ORAL SEE ADMIN INSTRUCTIONS
COMMUNITY
Start: 2024-02-23

## 2024-05-06 RX ORDER — ALBUTEROL SULFATE 90 UG/1
2 INHALANT RESPIRATORY (INHALATION)
COMMUNITY
Start: 2023-12-01

## 2024-05-06 RX ORDER — LEVOTHYROXINE SODIUM 150 UG/1
150 TABLET ORAL DAILY
COMMUNITY

## 2024-05-06 RX ORDER — SIMVASTATIN 10 MG/1
10 TABLET, FILM COATED ORAL DAILY
COMMUNITY

## 2024-05-06 RX ORDER — TRAZODONE HYDROCHLORIDE 100 MG/1
100 TABLET ORAL NIGHTLY
COMMUNITY

## 2024-05-06 RX ORDER — KETOCONAZOLE 20 MG/ML
SHAMPOO, SUSPENSION TOPICAL
COMMUNITY

## 2024-05-06 RX ORDER — CONJUGATED ESTROGENS 0.62 MG/G
0.5 CREAM VAGINAL DAILY
Qty: 30 G | Refills: 0 | Status: SHIPPED | OUTPATIENT
Start: 2024-05-06

## 2024-05-06 ASSESSMENT — PAIN SCALES - GENERAL: PAINLEVEL: 4

## 2024-05-06 NOTE — PATIENT INSTRUCTIONS
OK to take over the counter AZO (urinary symptom relief) for symptoms. Please return to clinic with any fever, chills, nausea, vomiting, abdominal pain or any other worsening.

## 2024-05-06 NOTE — PROGRESS NOTES
Subjective      Chief Complaint   Patient presents with    UTI         HPI    Paty is a 68-year-old female who is traveling from [place] and visiting the area with her  who has a PMH of hyperlipidemia, hypothyroidism, depression, and VENESSA.  She presents to the walk-in clinic with complaints of burning with urination.  She states that the pain started this morning when she woke up.  She states that she has felt fine up until today.  She states that she has pain in her perineum.  She describes pressure sensation in her lower abdomen.  She denies any fevers or chills.  She denies any back pain or flank pain.  She denies any vaginal discharge or bleeding.  She denies seeing any blood when she wipes or in the stool.  She states that her last bowel movement was this morning and that it was soft.  She denies having any new sexual partners.    The following have been reviewed and updated as appropriate in this visit:   Problems         Allergies   Allergen Reactions    Zolpidem Other (see comments)     Other Reaction(s): Behavioral Disturbances    PN: black outs    Behavioral disturbances     Current Outpatient Medications   Medication Sig Dispense Refill    albuterol HFA 90 mcg/actuation inhaler Inhale 2 puffs      azithromycin (ZITHROMAX) 250 mg tablet Take 1 tablet (250 mg total) by mouth See administration instructions      DULoxetine (CYMBALTA) 60 mg capsule Take 1 capsule (60 mg total) by mouth daily      fluticasone propionate (FLONASE) 50 mcg/actuation nasal spray Administer 1 spray into each nostril daily      ketoconazole (NIZORAL) 2 % shampoo Apply topically      levothyroxine (SYNTHROID, LEVOTHROID) 150 mcg tablet Take 1 tablet (150 mcg total) by mouth daily      simvastatin (ZOCOR) 10 mg tablet Take 1 tablet (10 mg total) by mouth daily      tetrahydrozoline 0.05 % ophthalmic solution Administer 1 drop into affected eye(s)      traZODone (DESYREL) 100 mg tablet Take 1 tablet (100 mg total) by mouth  nightly      conjugated estrogens (Premarin) vaginal cream Insert 0.5 g into the vagina daily Place small amount to the labia once daily for the next 7 days. 30 g 0     No current facility-administered medications for this visit.     No past medical history on file.  No past surgical history on file.  No family history on file.  Social History     Socioeconomic History    Marital status:        ROS:  Review of Systems   Constitutional: Negative.    Genitourinary:  Positive for decreased urine volume, dysuria and urgency. Negative for dyspareunia, flank pain, frequency, hematuria, pelvic pain, vaginal bleeding, vaginal discharge and vaginal pain.   Musculoskeletal: Negative.    Neurological: Negative.    All other systems reviewed and are negative.         Objective     VITAL SIGNS  /80 (BP Location: Left arm)   Pulse 75   Resp 20   SpO2 94%   unknown       PHYSICAL EXAM:  Physical Exam  Vitals and nursing note reviewed. Exam conducted with a chaperone present (Lexi CORTEZ LPN).   Constitutional:       Appearance: Normal appearance. She is not ill-appearing.   Cardiovascular:      Rate and Rhythm: Normal rate and regular rhythm.      Pulses: Normal pulses.      Heart sounds: Normal heart sounds. No murmur heard.  Pulmonary:      Effort: Pulmonary effort is normal.      Breath sounds: Normal breath sounds.   Abdominal:      General: Abdomen is flat. Bowel sounds are normal. There is no distension.      Palpations: Abdomen is soft. There is no mass.      Tenderness: There is no abdominal tenderness. There is no right CVA tenderness, left CVA tenderness, guarding or rebound.   Genitourinary:     Exam position: Supine.      Labia:         Right: No rash or tenderness.         Left: No rash or tenderness.       Urethra: Prolapse (Mild with slight erythema) present. No urethral pain, urethral swelling or urethral lesion.      Vagina: Normal.      Cervix: Normal.      Uterus: Normal.       Adnexa: Right adnexa  normal and left adnexa normal.   Musculoskeletal:         General: Normal range of motion.   Skin:     General: Skin is warm.      Capillary Refill: Capillary refill takes less than 2 seconds.   Neurological:      Mental Status: She is alert and oriented to person, place, and time.         Recent Results (from the past 24 hour(s))   Urinalysis, Dip (part 1 of panel) Urine, Clean Catch    Collection Time: 05/06/24 12:32 PM    Specimen: Urine, Clean Catch   Result Value Ref Range    Color, Urine Yellow Yellow    Clarity, Urine Clear Clear    pH, Urine 5.5 5.0 - 8.0 PH    Specific Gravity, Urine 1.010 1.003 - 1.030    Protein, Urine Negative Negative MG/DL    Glucose, Urine Negative Negative MG/DL    Ketones, Urine Negative Negative MG/DL    Blood, Urine Trace-intact (A) Negative    Nitrite, Urine Negative Negative    Bilirubin, Urine Negative Negative    Leukocytes, Urine Negative Negative    Urobilinogen, Urine 0.2 <2.0 mg/dL   Urinalysis, Micro (part 2 of panel) Urine, Clean Catch    Collection Time: 05/06/24 12:32 PM    Specimen: Urine, Clean Catch   Result Value Ref Range    RBC, Urine 0-2 None seen, 0-2, Negative /HPF    WBC, Urine 0-4 0 - 4 /HPF    Squamous Epithelial, Urine None seen None Seen-9 /HPF    Bacteria, Urine None seen None seen, Few /HPF   C-reactive protein (Inflammation) Blood, Venous    Collection Time: 05/06/24  1:09 PM   Result Value Ref Range    CRP <1.0 <=10.0 MG/L   Sedimentation rate, automated Blood, Venous    Collection Time: 05/06/24  1:09 PM   Result Value Ref Range    Sed Rate 9 <=20 mm/hr   CBC w/auto differential Blood, Venous    Collection Time: 05/06/24  1:09 PM   Result Value Ref Range    WBC 5.1 4.5 - 10.5 10*3/uL    RBC 4.37 3.70 - 5.30 10*6/µL    Hemoglobin 13.3 11.5 - 15.5 g/dL    Hematocrit 40.5 34.0 - 45.0 %    MCV 92.7 81.0 - 97.0 fL    MCH 30.5 28.0 - 33.0 pg    MCHC 32.9 32.0 - 36.0 g/dL    RDW 13.4 11.5 - 14.0 %    Platelets 205 140 - 350 10*3/uL    MPV 8.1 6.9 - 10.8 fL     Neutrophils% 72.1 41.0 - 81.0 %    Lymphocytes% 18.4 11.0 - 47.0 %    Monocytes% 7.9 3.0 - 11.0 %    Eosinophils% 1.0 0.0 - 3.0 %    Basophils% 0.6 0.0 - 2.0 %    ANC (auto diff) 3.70 10*3/UL    Lymphocytes Absolute 0.90 10*3/uL    Monocytes Absolute 0.40 10*3/uL    Eosinophils Absolute 0.10 10*3/uL    Basophils Absolute 0.00 10*3/uL   Comprehensive metabolic panel Blood, Venous    Collection Time: 05/06/24  1:09 PM   Result Value Ref Range    Sodium 139 135 - 145 mmol/L    Potassium 4.0 3.5 - 5.1 MMOL/L    Chloride 101 98 - 107 mmol/L    CO2 29 21 - 32 mmol/L    Anion Gap 9 3 - 11 mmol/L    BUN 18 7 - 25 mg/dL    Creatinine 0.90 0.60 - 1.10 mg/dL    Glucose 87 70 - 105 mg/dL    Calcium 10.0 8.6 - 10.3 mg/dL    AST 18 <40 U/L    ALT (SGPT) 13 7 - 52 U/L    Alkaline Phosphatase 70 55 - 142 U/L    Total Protein 7.6 6.0 - 8.3 g/dL    Albumin 4.8 3.5 - 5.3 g/dL    Total Bilirubin 0.38 0.20 - 1.40 mg/dL    Corrected Calcium 9.4 8.6 - 10.3 mg/dL    eGFR 70 >60 mL/min/1.73m*2     Pelvic exam: normal external genitalia, vulva, vagina, cervix, uterus and adnexa.      Assessment/Plan   Diagnoses and all orders for this visit:    Dysuria  -     Urinalysis w/microscopic, reflex culture Urine, Clean Catch; Future    Urgency of urination  -     C-reactive protein (Inflammation) Blood, Venous  -     Sedimentation rate, automated Blood, Venous  -     CBC w/auto differential Blood, Venous  -     Comprehensive metabolic panel Blood, Venous    Sensation of pressure in bladder area    Vaginal irritation  -     Vaginal Panel PCR  -     GC/CHLAMYDIA, PCR  -     conjugated estrogens (Premarin) vaginal cream; Insert 0.5 g into the vagina daily Place small amount to the labia once daily for the next 7 days.       Paty is a 68-year-old female who presents to the walk-in clinic with complaints of burning with urination that started just this morning.  She is only urinated once.  She denies any fever or chills.  She denies any flank pain  or back pain.  She denies any abdominal pain or pressure.  She does complain of perineal pain.  On examination she appears well and not in any acute distress.  Pelvic exam performed which shows slight prolapse urethra with mild erythema.  Vaginal exam did not produce any tenderness.   Workup in clinic included UA, CBC, CMP, CRP, ESR, vaginal panel to include trichomonas, bacterial vaginosis and yeast culture, as well as GC chlamydia swabs were obtained.   Urinalysis was done initially which only showed trace blood.  There were no leukocytes, nitrates WBCs or bacteria.  Discussed results with patient.  Discussed other possibilities with patient to include diverticulitis, bacterial vaginosis and other gynecological abnormalities.  CBC showed a WBC of 5.1, hemoglobin 13.3 platelets 205.  CMP showed normal sodium 139, K4.0, creatinine 0.90, normal liver enzymes and corrected calcium 9.4.  CRP was less than 1 and ESR was 9.  Genital cultures were negative for bacterial vaginosis, negative for Candida, negative for chlamydia, negative for gonorrhea and negative for trichomonas.   Discussed with patient that symptoms may be related to decreased hormones and mild urethral prolapse.  Patient encouraged to use topical Premarin vaginal cream to place a small amount to inside the labia once daily for the next 7 days to see if this helps her symptoms subside.  Patient is encouraged to follow-up with her PCP in [place] within the next couple weeks.   Patient instructed to return to the clinic or the nearest emergency room should she develop any fever, chills, nausea, vomiting, abdominal pain, back pain, flank pain, hematuria, vaginal pain, or any worsening symptoms or if she has any concerns.  Patient verbalized understanding.      Hiram Cueva CNP    A voice to text program was used to aid in medical record documentation. Sometimes words are printed not exactly as they were spoken. While efforts were made to carefully edit  and correct any inaccuracies, some errors may be present. Errors should be taken within the context of the discussion.  Please contact our office, [phone],if you need assistance interpreting this medical record or notice any mistakes.

## 2024-05-07 NOTE — RESULT ENCOUNTER NOTE
Please call the patient regarding her culture results.    All of your vaginal cultures were negative. No signs of yeast, bacterial vaginosis, or any sexually transmitted infections. Continue using your premarin cream as we discussed and follow up with your PCP when you return to [place]. Your urine culture is still pending at this time.    Hiram Cueva, CNP

## 2024-05-08 ASSESSMENT — ENCOUNTER SYMPTOMS
MUSCULOSKELETAL NEGATIVE: 1
DYSURIA: 1
FREQUENCY: 0
NEUROLOGICAL NEGATIVE: 1
FLANK PAIN: 0
CONSTITUTIONAL NEGATIVE: 1
HEMATURIA: 0

## 2024-06-04 ENCOUNTER — OFFICE VISIT (OUTPATIENT)
Dept: FAMILY MEDICINE | Facility: CLINIC | Age: 69
End: 2024-06-04

## 2024-06-04 VITALS
BODY MASS INDEX: 22.86 KG/M2 | SYSTOLIC BLOOD PRESSURE: 118 MMHG | DIASTOLIC BLOOD PRESSURE: 74 MMHG | TEMPERATURE: 97.8 F | OXYGEN SATURATION: 94 % | HEART RATE: 70 BPM | WEIGHT: 124 LBS

## 2024-06-04 DIAGNOSIS — J06.9 UPPER RESPIRATORY TRACT INFECTION, UNSPECIFIED TYPE: Primary | ICD-10-CM

## 2024-06-04 PROCEDURE — 99214 OFFICE O/P EST MOD 30 MIN: CPT | Performed by: FAMILY MEDICINE

## 2024-06-04 RX ORDER — AZITHROMYCIN 250 MG/1
TABLET, FILM COATED ORAL
Qty: 6 TABLET | Refills: 0 | Status: SHIPPED | OUTPATIENT
Start: 2024-06-04 | End: 2024-06-09

## 2024-06-04 RX ORDER — ALBUTEROL SULFATE 90 UG/1
2 AEROSOL, METERED RESPIRATORY (INHALATION) EVERY 6 HOURS PRN
Qty: 18 G | Refills: 1 | Status: SHIPPED | OUTPATIENT
Start: 2024-06-04

## 2024-06-04 NOTE — PROGRESS NOTES
Assessment & Plan   Problem List Items Addressed This Visit    None  Visit Diagnoses       Upper respiratory tract infection, unspecified type    -  Primary    Relevant Medications    azithromycin (ZITHROMAX) 250 MG tablet    albuterol (PROAIR HFA/PROVENTIL HFA/VENTOLIN HFA) 108 (90 Base) MCG/ACT inhaler           1. Upper respiratory tract infection, unspecified type  Raspy breath sounds both lungs. Good air flow. No wheeze. I will treat with oral antibiotics, albuterol inhaler. I discussed chest xray with her, which I don't think will change the treatment plan.   - azithromycin (ZITHROMAX) 250 MG tablet; Take 2 tablets (500 mg) by mouth daily for 1 day, THEN 1 tablet (250 mg) daily for 4 days.  Dispense: 6 tablet; Refill: 0  - albuterol (PROAIR HFA/PROVENTIL HFA/VENTOLIN HFA) 108 (90 Base) MCG/ACT inhaler; Inhale 2 puffs into the lungs every 6 hours as needed for shortness of breath, wheezing or cough  Dispense: 18 g; Refill: 1              FUTURE APPOINTMENTS:       - Follow-up visit as needed, if symptoms change or worsen.    No follow-ups on file.    Dottie Ragland MD  [facility]    Subjective     Nursing Notes:   Marylu Collier CMA  6/4/2024  1:11 PM  Signed  Chief Complaint   Patient presents with    URI     Cough and congestion started 6 days ago, she had body aches, fullness in her ears, wet cough, post-nasal drainage, heaviness in her chest, SOB, unable to take a deep breath in, negative at home covid test     Pre-visit Screening:  Immunizations:  not up to date - prevnar 20 when feeling better  Colonoscopy:  is up to date  Mammogram: is up to date  Asthma Action Test/Plan:  NA  PHQ9:  NA  GAD7:  NA  Questioned patient about current smoking habits Pt. quit smoking some time ago.  Ok to leave detailed message on voice mail for today's visit only Yes, phone # [phone]       Niecy Toro is a 68 year old female who presents to clinic today for the following health issues   HPI      Here with cough, congestion, fever a couple of days ago. No fevers today. Hasn't felt like eating. Has wheezing. At times has some shortness of breath, can't seem to take a deep breath. Started last week Wednesday or Thursday. Negative home covid test yesterday.         Review of Systems   Constitutional, HEENT, cardiovascular, pulmonary, gi and gu systems are negative, except as otherwise noted.      Objective    /74 (BP Location: Right arm, Patient Position: Sitting, Cuff Size: Adult Regular)   Pulse 70   Temp 97.8  F (36.6  C) (Temporal)   Wt 56.2 kg (124 lb)   SpO2 94%   BMI 22.86 kg/m    Body mass index is 22.86 kg/m .  Physical Exam   GENERAL: alert and no distress  RESP: lungs with good air flow both lung fields, raspy breath sounds throughout both lungs. No wheeze  CV: regular rate and rhythm, normal S1 S2, no S3 or S4, no murmur, click or rub, no peripheral edema  MS: no gross musculoskeletal defects noted, no edema  NEURO: Normal strength and tone, mentation intact and speech normal  PSYCH: mentation appears normal, affect normal/bright    No results found for any visits on 06/04/24.

## 2024-06-04 NOTE — NURSING NOTE
Chief Complaint   Patient presents with    URI     Cough and congestion started 6 days ago, she had body aches, fullness in her ears, wet cough, post-nasal drainage, heaviness in her chest, SOB, unable to take a deep breath in, negative at home covid test     Pre-visit Screening:  Immunizations:  not up to date - prevnar 20 when feeling better  Colonoscopy:  is up to date  Mammogram: is up to date  Asthma Action Test/Plan:  NA  PHQ9:  NA  GAD7:  NA  Questioned patient about current smoking habits Pt. quit smoking some time ago.  Ok to leave detailed message on voice mail for today's visit only Yes, phone # [phone]

## 2024-12-23 NOTE — PROGRESS NOTES
Preventive Care Visit  [facility]  Dottie Ragland MD, Family Medicine  Dec 24, 2024       SUBJECTIVE:   Hailee is a 69 year old, presenting for the following:  Physical (Fasting today ) and Recheck Medication (Refill medications)      HPI      Here for a physical.   She is a newer patient from [facility],needs all of her medications refilled.  History high cholesterol, is doing well on her cholesterol medications, due for labs and refills.  Thyroid: is due for labs and refills.  Sleep: is on trazodone,this seems to be working well for her, due for refills.          Have you ever done Advance Care Planning? (For example, a Health Directive, POLST, or a discussion with a medical provider or your loved ones about your wishes): No, advance care planning information given to patient to review.  Advanced care planning was discussed at today's visit.    Social History     Tobacco Use    Smoking status: Former     Passive exposure: Past    Smokeless tobacco: Never    Tobacco comments:     Quit at age 22   Substance Use Topics    Alcohol use: No              No data to display            No concerns  Reviewed orders with patient.  Reviewed health maintenance and updated orders accordingly - Yes  BP Readings from Last 3 Encounters:   12/24/24 116/74   06/04/24 118/74   02/23/24 118/76    Wt Readings from Last 3 Encounters:   12/24/24 56.7 kg (125 lb)   06/04/24 56.2 kg (124 lb)   02/23/24 54.9 kg (121 lb)                  Patient Active Problem List   Diagnosis    Other specified hypothyroidism    Other depression    Anxiety    Osteopenia of multiple sites    Insomnia    S/P revision of total knee, left    Status post right hip replacement    Other hyperlipidemia    Obstructive sleep apnea     Past Surgical History:   Procedure Laterality Date    ARTHROPLASTY REVISION KNEE Left 3/22/2019    Procedure: LEFT TOTAL KNEE REVISION ARTHROPLASTY;  Surgeon: Yohan Alamo MD;  Location:  OR    ORTHOPEDIC  SURGERY  09/11/2017    left total knee replacement    ORTHOPEDIC SURGERY      left wrist repair    TOTAL HIP ARTHROPLASTY Right 12/28/2023    Procedure: RIGHT TOTAL HIP ARTHRPLASTY DIRECT ANTERIOR APPROACH;  Surgeon: Yohan Alamo MD;  Location:  OR       Social History     Tobacco Use    Smoking status: Former     Passive exposure: Past    Smokeless tobacco: Never    Tobacco comments:     Quit at age 22   Substance Use Topics    Alcohol use: No     No family history on file.      Current Outpatient Medications   Medication Sig Dispense Refill    albuterol (PROAIR HFA/PROVENTIL HFA/VENTOLIN HFA) 108 (90 Base) MCG/ACT inhaler Inhale 2 puffs into the lungs every 6 hours as needed for shortness of breath, wheezing or cough. 18 g 1    ketoconazole (NIZORAL) 2 % external shampoo Apply topically daily as needed for itching or irritation      levothyroxine (SYNTHROID/LEVOTHROID) 150 MCG tablet Take 1 tablet (150 mcg) by mouth every morning (before breakfast). 90 tablet 1    simvastatin (ZOCOR) 10 MG tablet Take 1 tablet (10 mg) by mouth every evening. 90 tablet 1    tetrahydrozoline (VISINE) 0.05 % ophthalmic solution Place 1 drop into both eyes daily as needed      traZODone (DESYREL) 100 MG tablet Take 1 tablet (100 mg) by mouth at bedtime. 90 tablet 1       Breast Cancer Screening:    FHS-7:        No data to display              Mammogram referral done.    Pertinent mammograms are reviewed under the imaging tab.      History of abnormal Pap smear: per patient history normal. Declined pelvic today            Reviewed and updated as needed this visit by clinical staff   Tobacco  Allergies               Reviewed and updated as needed this visit by Provider                  Past Medical History:   Diagnosis Date    Anxiety     Arthritis     Depression     Depressive disorder     Hyperlipidemia     Hypothyroidism     Insomnia     Motion sickness     Osteopenia     Sleep apnea     Uncomplicated asthma       Past  "Surgical History:   Procedure Laterality Date    ARTHROPLASTY REVISION KNEE Left 3/22/2019    Procedure: LEFT TOTAL KNEE REVISION ARTHROPLASTY;  Surgeon: Yohan Alamo MD;  Location:  OR    ORTHOPEDIC SURGERY  09/11/2017    left total knee replacement    ORTHOPEDIC SURGERY      left wrist repair    TOTAL HIP ARTHROPLASTY Right 12/28/2023    Procedure: RIGHT TOTAL HIP ARTHRPLASTY DIRECT ANTERIOR APPROACH;  Surgeon: Yohan Alamo MD;  Location:  OR     Review of Systems    Review of Systems  Constitutional, HEENT, cardiovascular, pulmonary, gi and gu systems are negative, except as otherwise noted.    OBJECTIVE:   /74 (BP Location: Right arm, Patient Position: Sitting, Cuff Size: Adult Regular)   Pulse 63   Temp 97.9  F (36.6  C) (Temporal)   Ht 1.568 m (5' 1.75\")   Wt 56.7 kg (125 lb)   SpO2 97%   BMI 23.05 kg/m     Estimated body mass index is 23.05 kg/m  as calculated from the following:    Height as of this encounter: 1.568 m (5' 1.75\").    Weight as of this encounter: 56.7 kg (125 lb).  Physical Exam  GENERAL: alert and no distress  EYES: Eyes grossly normal to inspection, PERRL and conjunctivae and sclerae normal  HENT: ear canals and TM's normal, nose and mouth without ulcers or lesions  NECK: no adenopathy, no asymmetry, masses, or scars  RESP: lungs clear to auscultation - no rales, rhonchi or wheezes  BREAST: normal without masses, tenderness or nipple discharge and no palpable axillary masses or adenopathy  CV: regular rate and rhythm, normal S1 S2, no S3 or S4, no murmur, click or rub, no peripheral edema  ABDOMEN: soft, nontender, no hepatosplenomegaly, no masses and bowel sounds normal  MS: no gross musculoskeletal defects noted, no edema  SKIN: no suspicious lesions or rashes  NEURO: Normal strength and tone, mentation intact and speech normal  PSYCH: mentation appears normal, affect normal/bright  Pelvic declined    Diagnostic Test Results:  Labs reviewed in Epic  No results " found for any visits on 12/24/24.    ASSESSMENT/PLAN:   1. Encounter for routine history and physical exam in female patient (Primary)      2. Medicare annual wellness visit, subsequent  Completed.    3. Other sleep disorders  Refilled.  - traZODone (DESYREL) 100 MG tablet; Take 1 tablet (100 mg) by mouth at bedtime.  Dispense: 90 tablet; Refill: 1    4. Other specified hypothyroidism  Check labs, refilled.  - TSH WITH FREE T4 REFLEX ([facility])  - levothyroxine (SYNTHROID/LEVOTHROID) 150 MCG tablet; Take 1 tablet (150 mcg) by mouth every morning (before breakfast).  Dispense: 90 tablet; Refill: 1    5. Other depression  Not on medications currently.    6. Osteopenia of multiple sites  Referral done for dexa scan.    7. Upper respiratory tract infection, unspecified type  Refilled.  - albuterol (PROAIR HFA/PROVENTIL HFA/VENTOLIN HFA) 108 (90 Base) MCG/ACT inhaler; Inhale 2 puffs into the lungs every 6 hours as needed for shortness of breath, wheezing or cough.  Dispense: 18 g; Refill: 1    8. Encounter for screening mammogram for breast cancer    - MA Screening Bilateral w/ [facility]  - Radiology Referral (Affiliate Use Only)    9. Screening for osteoporosis    - Radiology Referral (Affiliate Use Only)  - DX Bone Density    10. Other hyperlipidemia  Check labs, refilled.  - VENOUS COLLECTION  - Comprehensive Metobolic Panel (BFP)  - Lipid Panel (BFP)  - simvastatin (ZOCOR) 10 MG tablet; Take 1 tablet (10 mg) by mouth every evening.  Dispense: 90 tablet; Refill: 1    11. ACP (advance care planning)  Discussed.    12. Major depressive disorder, recurrent episode, in full remission (H)        Patient has been advised of split billing requirements and indicates understanding: Yes      Counseling  Reviewed preventive health counseling, as reflected in patient instructions       Regular exercise       Healthy diet/nutrition        She reports that she has quit smoking. She has been exposed to tobacco smoke. She has never used  smokeless tobacco.          Signed Electronically by: Dottie Ragland MD

## 2024-12-24 ENCOUNTER — OFFICE VISIT (OUTPATIENT)
Dept: FAMILY MEDICINE | Facility: CLINIC | Age: 69
End: 2024-12-24

## 2024-12-24 VITALS
TEMPERATURE: 97.9 F | WEIGHT: 125 LBS | DIASTOLIC BLOOD PRESSURE: 74 MMHG | HEIGHT: 62 IN | HEART RATE: 63 BPM | OXYGEN SATURATION: 97 % | BODY MASS INDEX: 23 KG/M2 | SYSTOLIC BLOOD PRESSURE: 116 MMHG

## 2024-12-24 DIAGNOSIS — Z13.820 SCREENING FOR OSTEOPOROSIS: ICD-10-CM

## 2024-12-24 DIAGNOSIS — J06.9 UPPER RESPIRATORY TRACT INFECTION, UNSPECIFIED TYPE: ICD-10-CM

## 2024-12-24 DIAGNOSIS — G47.8 OTHER SLEEP DISORDERS: ICD-10-CM

## 2024-12-24 DIAGNOSIS — Z00.00 ENCOUNTER FOR ROUTINE HISTORY AND PHYSICAL EXAM IN FEMALE PATIENT: Primary | ICD-10-CM

## 2024-12-24 DIAGNOSIS — F32.89 OTHER DEPRESSION: ICD-10-CM

## 2024-12-24 DIAGNOSIS — Z71.89 ACP (ADVANCE CARE PLANNING): ICD-10-CM

## 2024-12-24 DIAGNOSIS — E78.49 OTHER HYPERLIPIDEMIA: ICD-10-CM

## 2024-12-24 DIAGNOSIS — E03.8 OTHER SPECIFIED HYPOTHYROIDISM: ICD-10-CM

## 2024-12-24 DIAGNOSIS — Z12.31 ENCOUNTER FOR SCREENING MAMMOGRAM FOR BREAST CANCER: ICD-10-CM

## 2024-12-24 DIAGNOSIS — Z00.00 MEDICARE ANNUAL WELLNESS VISIT, SUBSEQUENT: ICD-10-CM

## 2024-12-24 DIAGNOSIS — M85.89 OSTEOPENIA OF MULTIPLE SITES: ICD-10-CM

## 2024-12-24 DIAGNOSIS — F33.42 MAJOR DEPRESSIVE DISORDER, RECURRENT EPISODE, IN FULL REMISSION (H): ICD-10-CM

## 2024-12-24 LAB
ALBUMIN SERPL-MCNC: 4.4 G/DL (ref 3.6–5.1)
ALP SERPL-CCNC: 55 U/L (ref 33–130)
ALT 1742-6: 13 U/L (ref 0–32)
AST 1920-8: 18 U/L (ref 0–35)
BILIRUB SERPL-MCNC: 0.5 MG/DL (ref 0.2–1.2)
BUN SERPL-MCNC: 10 MG/DL (ref 7–25)
BUN/CREATININE RATIO: 13 (ref 6–32)
CALCIUM SERPL-MCNC: 9 MG/DL (ref 8.6–10.3)
CHLORIDE SERPLBLD-SCNC: 102.3 MMOL/L (ref 98–110)
CHOLEST SERPL-MCNC: 202 MG/DL (ref 0–199)
CHOLEST/HDLC SERPL: 2 {RATIO} (ref 0–5)
CO2 SERPL-SCNC: 32.1 MMOL/L (ref 20–32)
CREAT SERPL-MCNC: 0.79 MG/DL (ref 0.6–1.3)
GLUCOSE SERPL-MCNC: 82 MG/DL (ref 60–99)
HDLC SERPL-MCNC: 88 MG/DL (ref 40–150)
LDLC SERPL CALC-MCNC: 95 MG/DL (ref 0–129)
POTASSIUM SERPL-SCNC: 3.91 MMOL/L (ref 3.5–5.3)
PROT SERPL-MCNC: 7.1 G/DL (ref 6.1–8.1)
SODIUM SERPL-SCNC: 141.7 MMOL/L (ref 135–146)
TRIGL SERPL-MCNC: 93 MG/DL (ref 0–149)

## 2024-12-24 PROCEDURE — 80053 COMPREHEN METABOLIC PANEL: CPT | Performed by: FAMILY MEDICINE

## 2024-12-24 PROCEDURE — G0439 PPPS, SUBSEQ VISIT: HCPCS | Performed by: FAMILY MEDICINE

## 2024-12-24 PROCEDURE — 80061 LIPID PANEL: CPT | Performed by: FAMILY MEDICINE

## 2024-12-24 PROCEDURE — 99397 PER PM REEVAL EST PAT 65+ YR: CPT | Mod: 25 | Performed by: FAMILY MEDICINE

## 2024-12-24 PROCEDURE — 36415 COLL VENOUS BLD VENIPUNCTURE: CPT | Performed by: FAMILY MEDICINE

## 2024-12-24 RX ORDER — TRAZODONE HYDROCHLORIDE 100 MG/1
100 TABLET ORAL AT BEDTIME
Qty: 90 TABLET | Refills: 1 | Status: SHIPPED | OUTPATIENT
Start: 2024-12-24

## 2024-12-24 RX ORDER — ALBUTEROL SULFATE 90 UG/1
2 INHALANT RESPIRATORY (INHALATION) EVERY 6 HOURS PRN
Qty: 18 G | Refills: 1 | Status: SHIPPED | OUTPATIENT
Start: 2024-12-24

## 2024-12-24 RX ORDER — SIMVASTATIN 10 MG
10 TABLET ORAL EVERY EVENING
Qty: 90 TABLET | Refills: 1 | Status: SHIPPED | OUTPATIENT
Start: 2024-12-24

## 2024-12-24 RX ORDER — LEVOTHYROXINE SODIUM 150 UG/1
150 TABLET ORAL
Qty: 90 TABLET | Refills: 1 | Status: SHIPPED | OUTPATIENT
Start: 2024-12-24 | End: 2024-12-31

## 2024-12-24 NOTE — PROGRESS NOTES
Niecy Toro is a 69 year old female who presents for Medicare Annual Wellness Visit.    Current providers caring for this patient include:  Patient Care Team:  Marina Kyle PA-C as PCP - General (Family Medicine)  Dottie Ragland MD as Assigned PCP    Complete Medical and Social history reviewed with patient, outlined below.    Patient Active Problem List   Diagnosis    Other specified hypothyroidism    Other depression    Anxiety    Osteopenia of multiple sites    Insomnia    S/P revision of total knee, left    Status post right hip replacement    Other hyperlipidemia    Obstructive sleep apnea       Past Medical History:   Diagnosis Date    Anxiety     Arthritis     Depression     Depressive disorder     Hyperlipidemia     Hypothyroidism     Insomnia     Motion sickness     Osteopenia     Sleep apnea     Uncomplicated asthma        Past Surgical History:   Procedure Laterality Date    ARTHROPLASTY REVISION KNEE Left 3/22/2019    Procedure: LEFT TOTAL KNEE REVISION ARTHROPLASTY;  Surgeon: Yohan Alamo MD;  Location:  OR    ORTHOPEDIC SURGERY  09/11/2017    left total knee replacement    ORTHOPEDIC SURGERY      left wrist repair    TOTAL HIP ARTHROPLASTY Right 12/28/2023    Procedure: RIGHT TOTAL HIP ARTHRPLASTY DIRECT ANTERIOR APPROACH;  Surgeon: Yohan Alamo MD;  Location:  OR       No family history on file.    Social History     Tobacco Use    Smoking status: Former     Passive exposure: Past    Smokeless tobacco: Never    Tobacco comments:     Quit at age 22   Substance Use Topics    Alcohol use: No       Diet: regular, low salt/low fat  Physical Activity: patient exercises 7 times weekly,walks  daily   Depression Screen:    Over the past 2 weeks, patient has felt down, depressed, or hopeless:  No    Over the past 2 weeks, patient has felt little interest or pleasure in doing things: No    Functional ability/Safety screen:  Up and go test (able to get up and walk longer than 30  "seconds): Passed  Patient needs assistance with: nothing  Patient's home has the following possible safety concerns: none identified  Patient has concerns about her hearing:  No  Cognitive Screen  Patient repeats three objects (ball, flag, tree)      Clock drawing test:  NORMAL  Recalls three objects after 3 minutes (ball,flag,tree): recalls 2 objects (2 points)    Physical Exam:  /74 (BP Location: Right arm, Patient Position: Sitting, Cuff Size: Adult Regular)   Pulse 63   Temp 97.9  F (36.6  C) (Temporal)   Ht 1.568 m (5' 1.75\")   Wt 56.7 kg (125 lb)   SpO2 97%   BMI 23.05 kg/m     Body mass index is 23.05 kg/m .        Health Maintenance   Topic Date Due    LIPID  Never done    TSH W/FREE T4 REFLEX  Never done    HEPATITIS C SCREENING  Never done    Pneumococcal Vaccine: 50+ Years (1 of 1 - PCV) Never done    LUNG CANCER SCREENING  Never done    ADVANCE CARE PLANNING  03/26/2024    INFLUENZA VACCINE (1) 09/01/2024    MEDICARE ANNUAL WELLNESS VISIT  12/01/2024    MAMMO SCREENING  12/28/2024    ZOSTER IMMUNIZATION (1 of 2) 12/24/2025 (Originally 8/28/2006)    FALL RISK ASSESSMENT  02/02/2025    GLUCOSE  12/28/2026    COLORECTAL CANCER SCREENING  02/23/2029    DTAP/TDAP/TD IMMUNIZATION (3 - Td or Tdap) 09/03/2030    RSV VACCINE (1 - 1-dose 75+ series) 08/28/2031    DEXA  12/02/2036    PHQ-2 (once per calendar year)  Completed    COVID-19 Vaccine  Completed    HPV IMMUNIZATION  Aged Out    MENINGITIS IMMUNIZATION  Aged Out    RSV MONOCLONAL ANTIBODY  Aged Out         End of Life Planning:   Patient currently has an advanced directive: Yes.  Practitioner is supportive of decision.    Education/Counseling:   Based on review of the above information, the following items were addressed:      Healthy diet and regular exercise    Appropriate preventive services were discussed with this patient, including applicable screening as appropriate for cardiovascular disease, diabetes, osteopenia/osteoporosis, and " glaucoma.  As appropriate for age/gender, discussed screening for colorectal cancer, prostate cancer, breast cancer, and cervical cancer.   Checklist reviewing preventive services available has been given to the patient.    HM reviewed and is up to date.

## 2024-12-24 NOTE — NURSING NOTE
Chief Complaint   Patient presents with    Physical     Fasting today     Recheck Medication     Refill medications     Pre-visit Screening:  Immunizations:  up to date  Colonoscopy:  is up to date  Mammogram: is due and ordered today  Asthma Action Test/Plan:  NA  PHQ9:  NA  GAD7:  NA  Questioned patient about current smoking habits Pt. quit smoking some time ago.  Ok to leave detailed message on voice mail for today's visit only Yes, phone # [phone]

## 2024-12-25 LAB
T4, FREE, NON-DIALYSIS - QUEST: 1.1 NG/DL (ref 0.8–1.8)
TSH SERPL-ACNC: 20.24 MIU/L (ref 0.4–4.5)

## 2024-12-31 RX ORDER — LEVOTHYROXINE SODIUM 175 UG/1
175 TABLET ORAL
Qty: 60 TABLET | Refills: 0 | Status: SHIPPED | OUTPATIENT
Start: 2024-12-31

## 2025-02-05 ENCOUNTER — MYC MEDICAL ADVICE (OUTPATIENT)
Dept: FAMILY MEDICINE | Facility: CLINIC | Age: 70
End: 2025-02-05

## 2025-02-07 ENCOUNTER — OFFICE VISIT (OUTPATIENT)
Dept: FAMILY MEDICINE | Facility: CLINIC | Age: 70
End: 2025-02-07

## 2025-02-07 VITALS
SYSTOLIC BLOOD PRESSURE: 116 MMHG | TEMPERATURE: 98.1 F | HEART RATE: 72 BPM | WEIGHT: 123 LBS | OXYGEN SATURATION: 99 % | DIASTOLIC BLOOD PRESSURE: 74 MMHG | BODY MASS INDEX: 22.68 KG/M2

## 2025-02-07 DIAGNOSIS — R09.81 NASAL CONGESTION: ICD-10-CM

## 2025-02-07 DIAGNOSIS — J02.9 SORE THROAT: Primary | ICD-10-CM

## 2025-02-07 DIAGNOSIS — R05.8 OTHER SPECIFIED COUGH: ICD-10-CM

## 2025-02-07 LAB
COVID-19: NEGATIVE
FLUAV AG UPPER RESP QL IA.RAPID: NORMAL
FLUBV AG UPPER RESP QL IA.RAPID: NORMAL

## 2025-02-07 PROCEDURE — 87635 SARS-COV-2 COVID-19 AMP PRB: CPT | Performed by: FAMILY MEDICINE

## 2025-02-07 PROCEDURE — 87804 INFLUENZA ASSAY W/OPTIC: CPT | Mod: 59 | Performed by: FAMILY MEDICINE

## 2025-02-07 PROCEDURE — 99213 OFFICE O/P EST LOW 20 MIN: CPT | Performed by: FAMILY MEDICINE

## 2025-02-07 PROCEDURE — G2211 COMPLEX E/M VISIT ADD ON: HCPCS | Performed by: FAMILY MEDICINE

## 2025-02-07 RX ORDER — DULOXETIN HYDROCHLORIDE 60 MG/1
CAPSULE, DELAYED RELEASE ORAL
COMMUNITY

## 2025-02-07 NOTE — PROGRESS NOTES
"Assessment & Plan   Problem List Items Addressed This Visit    None  Visit Diagnoses       Sore throat    -  Primary    Relevant Orders    COVID-19 ([facility]) (Completed)    Influenza A and B ([facility]) (Completed)    Other specified cough        Cough        Relevant Orders    COVID-19 ([facility]) (Completed)    Influenza A and B ([facility]) (Completed)    Nasal congestion        Relevant Orders    COVID-19 ([facility]) (Completed)    Influenza A and B ([facility]) (Completed)           1. Sore throat (Primary)    - COVID-19 ([facility])  - Influenza A and B ([facility])    2. Other specified cough  Viral uri, continue to treat symptoms.    3. Cough    - COVID-19 ([facility])  - Influenza A and B ([facility])    4. Nasal congestion    - COVID-19 ([facility])  - Influenza A and B ([facility])              FUTURE APPOINTMENTS:       - Follow-up visit as needed. We manage her chronic medical care.    No follow-ups on file.    Dottie Ragland MD  [facility]    Subjective     Nursing Notes:   Amee Smith [facility]  2/7/2025 11:51 AM  Signed  Chief Complaint   Patient presents with    URI     Cough, congestion, throat felt scratchy and sneezing a lot, started Monday and feels that it has no progressed, did at home covid test at home, no fevers but overall feels \"hazey\"     Pre-visit Screening:  Immunizations:  up to date  Colonoscopy:  is up to date  Mammogram: is up to date  Asthma Action Test/Plan:  na  PHQ9:  na  GAD7:  na  Questioned patient about current smoking habits Pt. quit smoking some time ago.  Ok to leave detailed message on voice mail for today's visit only yes, phone # [phone] (home)          Niecy Toro is a 69 year old female who presents to clinic today for the following health issues   HPI     Cough congestion, sore throat for a few days, no fevers and breathing is ok. Home covid test negative.        Review of Systems   Constitutional, HEENT, cardiovascular, pulmonary, gi and gu systems are negative, except as otherwise noted.      Objective    BP " 116/74 (BP Location: Right arm, Patient Position: Sitting, Cuff Size: Adult Large)   Pulse 72   Temp 98.1  F (36.7  C) (Oral)   Wt 55.8 kg (123 lb)   SpO2 99%   BMI 22.68 kg/m    Body mass index is 22.68 kg/m .  Physical Exam   GENERAL: alert and no distress  RESP: lungs clear to auscultation - no rales, rhonchi or wheezes  CV: regular rate and rhythm, normal S1 S2, no S3 or S4, no murmur, click or rub, no peripheral edema  MS: no gross musculoskeletal defects noted, no edema  PSYCH: mentation appears normal, affect normal/bright    Results for orders placed or performed in visit on 02/07/25   COVID-19 ([facility])     Status: None   Result Value Ref Range    COVID-19 Negative    Influenza A and B ([facility])     Status: None   Result Value Ref Range    Influenza A neg neg    Influenza B neg neg

## 2025-02-07 NOTE — NURSING NOTE
"Chief Complaint   Patient presents with    URI     Cough, congestion, throat felt scratchy and sneezing a lot, started Monday and feels that it has no progressed, did at home covid test at home, no fevers but overall feels \"hazey\"     Pre-visit Screening:  Immunizations:  up to date  Colonoscopy:  is up to date  Mammogram: is up to date  Asthma Action Test/Plan:  na  PHQ9:  na  GAD7:  na  Questioned patient about current smoking habits Pt. quit smoking some time ago.  Ok to leave detailed message on voice mail for today's visit only yes, phone # [phone] (home)       "

## 2025-02-09 PROBLEM — M81.0 AGE-RELATED OSTEOPOROSIS WITHOUT CURRENT PATHOLOGICAL FRACTURE: Status: ACTIVE | Noted: 2025-02-09

## 2025-02-10 ENCOUNTER — OFFICE VISIT (OUTPATIENT)
Dept: FAMILY MEDICINE | Facility: CLINIC | Age: 70
End: 2025-02-10

## 2025-02-10 VITALS
DIASTOLIC BLOOD PRESSURE: 70 MMHG | HEART RATE: 76 BPM | SYSTOLIC BLOOD PRESSURE: 134 MMHG | WEIGHT: 125 LBS | TEMPERATURE: 97.8 F | HEIGHT: 62 IN | BODY MASS INDEX: 23 KG/M2 | RESPIRATION RATE: 20 BRPM

## 2025-02-10 DIAGNOSIS — M81.0 AGE-RELATED OSTEOPOROSIS WITHOUT CURRENT PATHOLOGICAL FRACTURE: Primary | ICD-10-CM

## 2025-02-10 DIAGNOSIS — E03.8 OTHER SPECIFIED HYPOTHYROIDISM: ICD-10-CM

## 2025-02-10 DIAGNOSIS — J06.9 VIRAL URI: ICD-10-CM

## 2025-02-10 PROCEDURE — 36415 COLL VENOUS BLD VENIPUNCTURE: CPT

## 2025-02-10 PROCEDURE — G2211 COMPLEX E/M VISIT ADD ON: HCPCS

## 2025-02-10 PROCEDURE — 99214 OFFICE O/P EST MOD 30 MIN: CPT | Mod: 25

## 2025-02-10 RX ORDER — ALENDRONATE SODIUM 70 MG/1
70 TABLET ORAL
Qty: 12 TABLET | Refills: 3 | Status: SHIPPED | OUTPATIENT
Start: 2025-02-10

## 2025-02-10 NOTE — LETTER
My Depression Action Plan  Name: Niecy Toro   Date of Birth 1955  Date: 2/10/2025    My doctor: Marina Kyle   My clinic: [facility]  [address]  [address]  [facility] [id number] [phone]            GREEN    ZONE   Good Control    What it looks like:   Things are going generally well. You have normal ups and downs. You may even feel depressed from time to time, but bad moods usually last less than a day.   What you need to do:  Continue to care for yourself (see self care plan)  Check your depression survival kit and update it as needed  Follow your physician s recommendations including any medication.  Do not stop taking medication unless you consult with your physician first.             YELLOW         ZONE Getting Worse    What it looks like:   Depression is starting to interfere with your life.   It may be hard to get out of bed; you may be starting to isolate yourself from others.  Symptoms of depression are starting to last most all day and this has happened for several days.   You may have suicidal thoughts but they are not constant.   What you need to do:     Call your care team. Your response to treatment will improve if you keep your care team informed of your progress. Yellow periods are signs an adjustment may need to be made.     Continue your self-care.  Just get dressed and ready for the day.  Don't give yourself time to talk yourself out of it.    Talk to someone in your support network.    Open up your Depression Self-Care Plan/Wellness Kit.             RED    ZONE Medical Alert - Get Help    What it looks like:   Depression is seriously interfering with your life.   You may experience these or other symptoms: You can t get out of bed most days, can t work or engage in other necessary activities, you have trouble taking care of basic hygiene, or basic responsibilities, thoughts of suicide or death that will not go away, self-injurious  behavior.     What you need to do:  Call your care team and request a same-day appointment. If they are not available (weekends or after hours) call your local crisis line, emergency room or 911.          Depression Self-Care Plan / Wellness Kit    Many people find that medication and therapy are helpful treatments for managing depression. In addition, making small changes to your everyday life can help to boost your mood and improve your wellbeing. Below are some tips for you to consider. Be sure to talk with your medical provider and/or behavioral health consultant if your symptoms are worsening or not improving.     Sleep   Sleep hygiene  means all of the habits that support good, restful sleep. It includes maintaining a consistent bedtime and wake time, using your bedroom only for sleeping or sex, and keeping the bedroom dark and free of distractions like a computer, smartphone, or television.     Develop a Healthy Routine  Maintain good hygiene. Get out of bed in the morning, make your bed, brush your teeth, take a shower, and get dressed. Don t spend too much time viewing media that makes you feel stressed. Find time to relax each day.    Exercise  Get some form of exercise every day. This will help reduce pain and release endorphins, the  feel good  chemicals in your brain. It can be as simple as just going for a walk or doing some gardening, anything that will get you moving.      Diet  Strive to eat healthy foods, including fruits and vegetables. Drink plenty of water. Avoid excessive sugar, caffeine, alcohol, and other mood-altering substances.     Stay Connected with Others  Stay in touch with friends and family members.    Manage Your Mood  Try deep breathing, massage therapy, biofeedback, or meditation. Take part in fun activities when you can. Try to find something to smile about each day.     Psychotherapy  Be open to working with a therapist if your provider recommends it.     Medication  Be sure to  take your medication as prescribed. Most anti-depressants need to be taken every day. It usually takes several weeks for medications to work. Not all medicines work for all people. It is important to follow-up with your provider to make sure you have a treatment plan that is working for you. Do not stop your medication abruptly without first discussing it with your provider.    Crisis Resources   These hotlines are for both adults and children. They and are open 24 hours a day, 7 days a week unless noted otherwise.    National Suicide Prevention Lifeline   988 or 5-114-505-UIRE (8470)    Crisis Text Line    www.crisistextline.org  Text HOME to 015805 from anywhere in the [place], anytime, about any type of crisis. A live, trained crisis counselor will receive the text and respond quickly.    [employer] Lifeline for LGBTQ Youth  A national crisis intervention and suicide lifeline for LGBTQ youth under 25. Provides a safe place to talk without judgement. Call 1-549.919.8922; text START to 654436 or visit www.thetrevorproject.org to talk to a trained counselor.    For [facility] crisis numbers, visit the [facility] website at:  https://[place].gov/dhs/people-we-serve/adults/health-care/mental-health/resources/crisis-contacts.jsp

## 2025-02-10 NOTE — NURSING NOTE
Niecy Toro is here for a consult for starting osteoporosis medication and recheck if her thyroid medication.    Questioned patient about current smoking habits.  Pt. quit smoking some time ago.  PULSE regular  My Chart: activw  CLASSIFICATION OF OVERWEIGHT AND OBESITY BY BMI                        Obesity Class           BMI(kg/m2)  Underweight                                    < 18.5  Normal                                         18.5-24.9  Overweight                                     25.0-29.9  OBESITY                     I                  30.0-34.9                             II                 35.0-39.9  EXTREME OBESITY             III                >40                            Patient's  BMI Body mass index is 23.05 kg/m .  http://hin.nhlbi.nih.gov/menuplanner/menu.cgi  Pre-visit planning  Immunizations - up to date  Colonoscopy - is up to date  Mammogram - is up to date  Asthma -   PHQ9 -    ANGELIC-7 -      The patient has verbalized that it is ok to leave a detailed voice message on the patient's cell phone with results/recommendations from this visit.

## 2025-02-10 NOTE — PROGRESS NOTES
Assessment & Plan     1. Age-related osteoporosis without current pathological fracture (Primary)  - Reviewed Dexa scan results with patient in detail today. Recommend starting Fosamax 70 mg weekly to help increase bone density. Discussed with Hailee common side effects of medication and how to take medication (e.g., on an empty stomach, with a tall 8 oz glass of water, and to sit upright for 30 minutes after taking dose). Also discussed black box warning of medication. Will also check parathyroid and calcium levels today to ensure no secondary causes of osteoporosis. She will also ensure she is taking at least 1000 international unit(s) of vitamin D3, 1200 mg of calcium, and daily weight bearing exercises. Will plan to repeat dexa scan in one year. She will contact me is she has any problems with Fosamax. Return to clinic and ER precautions discussed.   - PTH, Intact and Calcium ([facility])  - alendronate (FOSAMAX) 70 MG tablet; Take 1 tablet (70 mg) by mouth every 7 days.  Dispense: 12 tablet; Refill: 3    2. Other specified hypothyroidism  - Labs pending, patient will be notified of results on my chart. Discussed possibility of decreasing dose of Levothyroxine back down to 150 mcg as she was previously only taking the medication every other day. Will send my chart with results and next steps.   - VENOUS COLLECTION  - TSH WITH FREE T4 REFLEX ([facility])    3. Viral URI  - Discussed ongoing viral URI, recommend plenty of rest, fluids, and supportive OTC cares such as saline nasal rinses followed by Flonase NS, tea with honey for cough, etc. Return to clinic and ER precautions discussed.     Will follow up with thyroid results on my chart, otherwise will follow up in 06/2025 for a medication recheck. Reasons to follow-up sooner or seek emergent care reviewed.     Marina Kyle PA-C  [facility]       Subjective     Niecy Toro is a 69 year old female who presents to clinic today for the following  health issues:    HPI   Chief Complaint   Patient presents with    Consult     Medication for osteoporosis and thyroid      Hailee presents today for a consult on osteoporosis and also to discuss a few other concerns today.     -Osteoporosis: Had recent dexa scan on 01/24/25 which showed osteoporosis of her left hip and osteopenia of her lumbar spine. She would like to discuss treatment options for osteoporosis. Notes she has had a few fractures in the past; had a left wrist fracture after a fall and also had a left humerus fracture from ice skating. She also had her right hip replaced and a left knee arthroplasty x 2. Notes she reached menopause early around age 46. She did smoke socially in her college years but nothing consistently. Notes there is a family history of osteopenia in one of her sister's and osteoporosis in her other sister who is on Fosamax and she has had great success with this. She does take vitamin D3 drops daily, is unsure of the dose. She does not take a daily women's multivitamin or calcium tablet. Does not have much calcium from her diet as she is lactose intolerant. She does not formally work out, but does try to be active with household chores and going on walks with her dogs. Notes it is harder to be active in the winter months. Notes she is more active in the summer months, when she is gardening.     -Hypothyroidism: Was last seen here for her thyroid in 12/2024 at her physical which she was taking Levothyroxine 150 mcg at the time and her TSH came back high at 20.24, normal T4 at 1.1. She does note know however that she was only taking her Levothyroxine every other day. Her dose of levothyroxine was increased to 175 mcg which she has been taking daily every morning, on an empty stomach, 30 minutes before her morning meal of the day. She denies any hypo or hyperthyroid symptoms.     -Recent URI: Last seen for this last week on 02/07/25, had negative COVID and influenza testing. Notes her  "left ear, congestion, and throat are still bothering her. Has been taking Mucinex for her symptoms. Denies any fevers/chills, SOB, chest pain, or wheezing.     Daily medications reviewed.       Objective    /70 (BP Location: Right arm, Patient Position: Chair, Cuff Size: Adult Regular)   Pulse 76   Temp 97.8  F (36.6  C) (Temporal)   Resp 20   Ht 1.568 m (5' 1.75\")   Wt 56.7 kg (125 lb)   BMI 23.05 kg/m    Body mass index is 23.05 kg/m .    Physical Examination:  GENERAL: healthy, alert and no distress  EYES: Eyes grossly normal to inspection, PERRL and conjunctivae and sclerae normal  HENT: ear canals and TM's normal, congestion present, and mouth without ulcers or lesions  NECK: no adenopathy, no asymmetry, masses, or scars and thyroid normal to palpation  RESP: lungs clear to auscultation - no rales, rhonchi or wheezes  CV: regular rate and rhythm, normal S1 S2, no S3 or S4, no murmur, click or rub  MS: no gross musculoskeletal defects noted, no edema  SKIN: no suspicious lesions or rashes  PSYCH: mentation appears normal, affect normal/bright    Lab work pending.   "

## 2025-02-11 LAB
T4, FREE, NON-DIALYSIS - QUEST: 1.6 NG/DL (ref 0.8–1.8)
TSH SERPL-ACNC: 0.03 MIU/L (ref 0.4–4.5)

## 2025-02-11 RX ORDER — LEVOTHYROXINE SODIUM 150 UG/1
150 TABLET ORAL
Qty: 60 TABLET | Refills: 0 | Status: SHIPPED | OUTPATIENT
Start: 2025-02-11

## 2025-02-12 LAB
CALCIUM SERPL-MCNC: 9.3 MG/DL (ref 8.6–10.4)
PTH, INTACT: 54 PG/ML (ref 16–77)

## 2025-02-17 ENCOUNTER — MYC REFILL (OUTPATIENT)
Dept: FAMILY MEDICINE | Facility: CLINIC | Age: 70
End: 2025-02-17

## 2025-02-17 DIAGNOSIS — F41.9 ANXIETY: Primary | ICD-10-CM

## 2025-02-17 DIAGNOSIS — F32.89 OTHER DEPRESSION: ICD-10-CM

## 2025-02-17 RX ORDER — DULOXETIN HYDROCHLORIDE 60 MG/1
60 CAPSULE, DELAYED RELEASE ORAL DAILY
Qty: 90 CAPSULE | Refills: 1 | Status: CANCELLED | OUTPATIENT
Start: 2025-02-17

## 2025-02-17 NOTE — TELEPHONE ENCOUNTER
Ask her to make sure she is taking this for depression. She didn't  discuss at her apt. Is she getting this filled by psychiatry? She didn't bring this up at the apt.

## 2025-02-17 NOTE — TELEPHONE ENCOUNTER
Pt last seen 12/24/24 for cpx and med refills.    Niecy Toro is requesting a refill of:    Pending Prescriptions:                       Disp   Refills    DULoxetine (CYMBALTA) 60 MG capsule       90 cap*1            Sig: Take 1 capsule (60 mg) by mouth daily.

## 2025-02-19 RX ORDER — DULOXETIN HYDROCHLORIDE 60 MG/1
60 CAPSULE, DELAYED RELEASE ORAL DAILY
Qty: 90 CAPSULE | Refills: 1 | Status: SHIPPED | OUTPATIENT
Start: 2025-02-19

## 2025-02-19 NOTE — TELEPHONE ENCOUNTER
Spoke to patient over the phone who did state she understands that she is due every 6 months for her medication check visit and that this mediation works very well for her. She has been taking this for a long time now and has had no negative side effects. Patient is seeing Gloria going forward as PCP, routing to Gloria for approval of medication.       Pending Prescriptions:                       Disp   Refills    DULoxetine (CYMBALTA) 60 MG capsule       90 cap*1            Sig: Take 1 capsule (60 mg) by mouth daily.

## 2025-04-07 ENCOUNTER — OFFICE VISIT (OUTPATIENT)
Dept: FAMILY MEDICINE | Facility: CLINIC | Age: 70
End: 2025-04-07

## 2025-04-07 VITALS
HEIGHT: 62 IN | DIASTOLIC BLOOD PRESSURE: 70 MMHG | BODY MASS INDEX: 22.82 KG/M2 | TEMPERATURE: 97 F | HEART RATE: 55 BPM | OXYGEN SATURATION: 100 % | SYSTOLIC BLOOD PRESSURE: 114 MMHG | WEIGHT: 124 LBS

## 2025-04-07 DIAGNOSIS — F32.89 OTHER DEPRESSION: ICD-10-CM

## 2025-04-07 DIAGNOSIS — M81.0 AGE-RELATED OSTEOPOROSIS WITHOUT CURRENT PATHOLOGICAL FRACTURE: ICD-10-CM

## 2025-04-07 DIAGNOSIS — G47.33 OBSTRUCTIVE SLEEP APNEA: Chronic | ICD-10-CM

## 2025-04-07 DIAGNOSIS — E03.8 OTHER SPECIFIED HYPOTHYROIDISM: ICD-10-CM

## 2025-04-07 DIAGNOSIS — Z01.818 PREOPERATIVE EXAMINATION: Primary | ICD-10-CM

## 2025-04-07 DIAGNOSIS — F41.9 ANXIETY: ICD-10-CM

## 2025-04-07 DIAGNOSIS — M21.611 BUNION, RIGHT: ICD-10-CM

## 2025-04-07 DIAGNOSIS — G47.00 INSOMNIA, UNSPECIFIED TYPE: ICD-10-CM

## 2025-04-07 DIAGNOSIS — E78.49 OTHER HYPERLIPIDEMIA: ICD-10-CM

## 2025-04-07 LAB
BUN SERPL-MCNC: 10 MG/DL (ref 7–25)
BUN/CREATININE RATIO: 13 (ref 6–32)
CALCIUM SERPL-MCNC: 9.3 MG/DL (ref 8.6–10.3)
CHLORIDE SERPLBLD-SCNC: 105.2 MMOL/L (ref 98–110)
CO2 SERPL-SCNC: 27.1 MMOL/L (ref 20–32)
CREAT SERPL-MCNC: 0.77 MG/DL (ref 0.6–1.3)
ERYTHROCYTE [DISTWIDTH] IN BLOOD BY AUTOMATED COUNT: 12.4 %
GLUCOSE SERPL-MCNC: 89 MG/DL (ref 60–99)
HCT VFR BLD AUTO: 40.5 % (ref 35–47)
HEMOGLOBIN: 12.7 G/DL (ref 11.7–15.7)
MCH RBC QN AUTO: 29.6 PG (ref 26–33)
MCHC RBC AUTO-ENTMCNC: 31.4 G/DL (ref 31–36)
MCV RBC AUTO: 94.4 FL (ref 78–100)
PLATELET COUNT - QUEST: NORMAL 10^9/L (ref 150–375)
POTASSIUM SERPL-SCNC: 4.37 MMOL/L (ref 3.5–5.3)
RBC # BLD AUTO: 4.29 10*12/L (ref 3.8–5.2)
SODIUM SERPL-SCNC: 136.5 MMOL/L (ref 135–146)
WBC # BLD AUTO: 4.5 10*9/L (ref 4–11)

## 2025-04-07 PROCEDURE — 36415 COLL VENOUS BLD VENIPUNCTURE: CPT

## 2025-04-07 PROCEDURE — 99214 OFFICE O/P EST MOD 30 MIN: CPT

## 2025-04-07 PROCEDURE — 85027 COMPLETE CBC AUTOMATED: CPT

## 2025-04-07 PROCEDURE — 80048 BASIC METABOLIC PNL TOTAL CA: CPT

## 2025-04-07 RX ORDER — DULOXETIN HYDROCHLORIDE 60 MG/1
60 CAPSULE, DELAYED RELEASE ORAL DAILY
Qty: 60 CAPSULE | Refills: 0 | Status: SHIPPED | OUTPATIENT
Start: 2025-04-07

## 2025-04-07 RX ORDER — FLUTICASONE PROPIONATE 50 MCG
SPRAY, SUSPENSION (ML) NASAL
COMMUNITY
Start: 2025-03-25

## 2025-04-07 NOTE — NURSING NOTE
Chief Complaint   Patient presents with    Pre-Op Exam     Right foot surgery at [facility] 4/16/25 with Dr Julito Phelan

## 2025-04-07 NOTE — PROGRESS NOTES
Preoperative Evaluation  [facility] [place]  [address] [place] [address]  [address]  [facility] [address]  Phone: [phone]  Fax: [phone]  Primary Provider: Marina Kyle PA-C  Pre-op Performing Provider: Marina Kyle PA-C  Apr 7, 2025 4/7/2025   Surgical Information   What procedure is being done? Right foot surgery   Facility or [place] where procedure/surgery [place] [facility] [place]   Who is doing the procedure / surgery? Dr Julito Chase   Date of surgery / procedure: 4/16/25   Time of surgery / procedure: TBD   Where do you plan to recover after surgery? at home with family     Fax number for surgical facility: [phone]    Assessment & Plan     The proposed surgical procedure is considered INTERMEDIATE risk.    Preoperative examination  - Patient is cleared for surgery. CBC and BMP are within normal limits.   - VENOUS COLLECTION  - Hemogram Platelet (BFP)  - Basic Metabolic Panel (BFP)    Bunion, right    Other specified hypothyroidism  - Well controlled.   - VENOUS COLLECTION  - TSH ([facility])  - T4 FREE ([facility])    Other depression  - Well controlled.   - DULoxetine (CYMBALTA) 60 MG capsule; Take 1 capsule (60 mg) by mouth daily.    Anxiety  - Well controlled.     Age-related osteoporosis without current pathological fracture  - Stable, on medications.     Insomnia, unspecified type  - Well controlled.     Other hyperlipidemia  - Well controlled.     Obstructive sleep apnea  - Well controlled, has CPAP machine.     Antiplatelet or Anticoagulation Medication Instructions   - We reviewed the medication list and the patient is not on an antiplatelet or anticoagulation medications.    Additional Medication Instructions  - We reviewed the medication list and there are no chronic medications that need to be adjusted for this procedure.    Recommendation  - Approval given to proceed with proposed procedure, without further diagnostic evaluation.    Jaswinder Stephen is a 69 year old,  presenting for the following:  Pre-Op Exam (Right foot surgery at [facility] 4/16/25 with Dr Julito Phelan)    Hailee presents for a preoperative examination for upcoming right bunion surgery on 04/16/25 with [place]. Chase [place] [facility] States she has had bunions for years, causing a lot of pain and discomfort.     Medical history and daily medications reviewed.     HPI:        4/7/2025   Pre-Op Questionnaire   Have you ever had a heart attack or stroke? No   Have you ever had surgery on your heart or blood vessels, such as a stent placement, a coronary artery bypass, or surgery on an artery in your head, neck, heart, or legs? No   Do you have chest pain with activity? No   Do you have a history of heart failure? No   Do you currently have a cold, bronchitis or symptoms of other infection? No   Do you have a cough, shortness of breath, or wheezing? No   Do you or anyone in your family have previous history of blood clots? No   Do you or does anyone in your family have a serious bleeding problem such as prolonged bleeding following surgeries or cuts? No   Have you ever had problems with anemia or been told to take iron pills? No   Have you had any abnormal blood loss such as black, tarry or bloody stools, or abnormal vaginal bleeding? No   Have you ever had a blood transfusion? No   Are you willing to have a blood transfusion if it is medically needed before, during, or after your surgery? Yes   Have you or any of your relatives ever had problems with anesthesia? No   Do you have sleep apnea, excessive snoring or daytime drowsiness? (!) YES   Do you have a CPAP machine? Yes   Do you have any artifical heart valves or other implanted medical devices like a pacemaker, defibrillator, or continuous glucose monitor? No   Do you have artificial joints? (!) YES, left knee and right hip   Are you allergic to latex? No     Health Care Directive  Patient has a Health Care Directive on file    Preoperative Review of     reviewed - no record of controlled substances prescribed.    Status of Chronic Conditions:  See problem list for active medical problems.  Problems all longstanding and stable, except as noted/documented.  See ROS for pertinent symptoms related to these conditions.    Patient Active Problem List    Diagnosis Date Noted    Age-related osteoporosis without current pathological fracture 02/09/2025     Priority: Medium    Major depressive disorder, recurrent episode, in full remission 12/24/2024     Priority: Medium    Status post right hip replacement 12/28/2023     Priority: Medium    S/P revision of total knee, left 03/22/2019     Priority: Medium    Other specified hypothyroidism      Priority: Medium    Anxiety      Priority: Medium    Insomnia      Priority: Medium    Obstructive sleep apnea 04/20/2011     Priority: Medium     Setting: CPAP 8cm H2O  Supplied by: [facility]  PSG done: 10/28/1997  AHI 54  RDI 80  Lowest O2 Sat: 86%  HST 4/27/2024 (127 lbs)  RDI 58  Low 02 82%  Replacement  machine ordered April 2024      Other hyperlipidemia 07/13/2010     Priority: Medium     Formatting of this note might be different from the original.   High of 333 in 2011.  Started on a statin.  Quit statin in 2021.        Past Medical History:   Diagnosis Date    Anxiety     Arthritis     Depression     Depressive disorder     Hyperlipidemia     Hypothyroidism     Insomnia     Motion sickness     Osteopenia     Sleep apnea     Uncomplicated asthma      Past Surgical History:   Procedure Laterality Date    ARTHROPLASTY REVISION KNEE Left 3/22/2019    Procedure: LEFT TOTAL KNEE REVISION ARTHROPLASTY;  Surgeon: Yohan Alamo MD;  Location:  OR    ORTHOPEDIC SURGERY  09/11/2017    left total knee replacement    ORTHOPEDIC SURGERY      left wrist repair    TOTAL HIP ARTHROPLASTY Right 12/28/2023    Procedure: RIGHT TOTAL HIP ARTHRPLASTY DIRECT ANTERIOR APPROACH;  Surgeon: Yohan Alamo MD;  Location:  OR     Current Outpatient  Medications   Medication Sig Dispense Refill    albuterol (PROAIR HFA/PROVENTIL HFA/VENTOLIN HFA) 108 (90 Base) MCG/ACT inhaler Inhale 2 puffs into the lungs every 6 hours as needed for shortness of breath, wheezing or cough. 18 g 1    alendronate (FOSAMAX) 70 MG tablet Take 1 tablet (70 mg) by mouth every 7 days. 12 tablet 3    DULoxetine (CYMBALTA) 60 MG capsule Take 1 capsule (60 mg) by mouth daily. 60 capsule 0    fluticasone (FLONASE) 50 MCG/ACT nasal spray instill 1 spray into each nostril daily      ketoconazole (NIZORAL) 2 % external shampoo Apply topically daily as needed for itching or irritation      levothyroxine (SYNTHROID/LEVOTHROID) 150 MCG tablet Take 1 tablet (150 mcg) by mouth every morning (before breakfast). 60 tablet 0    simvastatin (ZOCOR) 10 MG tablet Take 1 tablet (10 mg) by mouth every evening. 90 tablet 1    tetrahydrozoline (VISINE) 0.05 % ophthalmic solution Place 1 drop into both eyes daily as needed      traZODone (DESYREL) 100 MG tablet Take 1 tablet (100 mg) by mouth at bedtime. 90 tablet 1     Allergies   Allergen Reactions    Ambien [Zolpidem] Other (See Comments)     Behavioral disturbances    Lactose Unknown      Social History     Tobacco Use    Smoking status: Former     Passive exposure: Past    Smokeless tobacco: Never    Tobacco comments:     Quit at age 22   Substance Use Topics    Alcohol use: No     Family History   Problem Relation Age of Onset    Hypertension Mother     Hyperlipidemia Mother     Diabetes Type 2  Mother     Kidney Disease Mother     Lung Cancer Father     Colon Cancer Father     Aortic aneurysm Father     Osteopenia Sister     Osteoporosis Sister     Other - See Comments Brother          at 65 from an accident    Diabetes Paternal Grandmother     Myocardial Infarction Paternal Grandfather     No Known Problems Daughter     No Known Problems Daughter     No Known Problems Son      History   Drug Use    Types: Marijuana     Comment: gummies occasionlly  "    Review of Systems  CONSTITUTIONAL: NEGATIVE for fever, chills, change in weight  INTEGUMENTARY/SKIN: NEGATIVE for worrisome rashes, moles or lesions  EYES: NEGATIVE for vision changes or irritation  ENT/MOUTH: NEGATIVE for ear, mouth and throat problems  RESP: NEGATIVE for significant cough or SOB  CV: NEGATIVE for chest pain, palpitations or peripheral edema  GI: NEGATIVE for nausea, abdominal pain, heartburn, or change in bowel habits  : NEGATIVE for frequency, dysuria, or hematuria  MUSCULOSKELETAL: NEGATIVE for significant arthralgias or myalgia  NEURO: NEGATIVE for weakness, dizziness or paresthesias  ENDOCRINE: NEGATIVE for temperature intolerance, skin/hair changes  HEME: NEGATIVE for bleeding problems  PSYCHIATRIC: NEGATIVE for changes in mood or affect    Objective    /70 (BP Location: Left arm, Patient Position: Sitting, Cuff Size: Adult Large)   Pulse 55   Temp 97  F (36.1  C) (Temporal)   Ht 1.575 m (5' 2\")   Wt 56.2 kg (124 lb)   SpO2 100%   BMI 22.68 kg/m     Estimated body mass index is 22.68 kg/m  as calculated from the following:    Height as of this encounter: 1.575 m (5' 2\").    Weight as of this encounter: 56.2 kg (124 lb).    Physical Exam  GENERAL: alert and no distress  EYES: Eyes grossly normal to inspection, PERRL and conjunctivae and sclerae normal  HENT: ear canals and TM's normal, nose and mouth without ulcers or lesions  NECK: no adenopathy, no asymmetry, masses, or scars  RESP: lungs clear to auscultation - no rales, rhonchi or wheezes  CV: regular rate and rhythm, normal S1 S2, no S3 or S4, no murmur, click or rub, no peripheral edema  ABDOMEN: soft, nontender, no hepatosplenomegaly, no masses and bowel sounds normal  MS: no gross musculoskeletal defects noted, no edema  SKIN: no suspicious lesions or rashes  NEURO: Normal strength and tone, mentation intact and speech normal  PSYCH: mentation appears normal, affect normal/bright    Recent Labs   Lab Test " 12/24/24  0000   .7   POTASSIUM 3.91   CR 0.79      Diagnostics  Recent Results (from the past 24 hours)   Hemogram Platelet (BFP)    Collection Time: 04/07/25 12:00 AM   Result Value Ref Range    WBC 4.5 4.0 - 11 10*9/L    RBC Count 4.29 3.8 - 5.2 10*12/L    Hemoglobin 12.7 11.7 - 15.7 g/dL    Hematocrit 40.5 35.0 - 47.0 %    MCV 94.4 78 - 100 fL    MCH 29.6 26 - 33 pg    MCHC 31.4 31 - 36 g/dL    RDW 12.4 %    Platelet Count 187t 150 - 375 10^9/L   Basic Metabolic Panel (BFP)    Collection Time: 04/07/25 12:00 AM   Result Value Ref Range    Carbon Dioxide 27.1 20 - 32 mmol/L    Creatinine 0.77 0.60 - 1.30 mg/dL    Glucose 89 60 - 99 mg/dL    Sodium 136.5 135 - 146 mmol/L    Potassium 4.37 3.5 - 5.3 mmol/L    Chloride 105.2 98 - 110 mmol/L    Urea Nitrogen 10 7 - 25 mg/dL    Calcium 9.3 8.6 - 10.3 mg/dL    BUN/Creatinine Ratio 13 6 - 32      No EKG required, no history of coronary heart disease, significant arrhythmia, peripheral arterial disease or other structural heart disease.    Revised Cardiac Risk Index (RCRI)  The patient has the following serious cardiovascular risks for perioperative complications:   - No serious cardiac risks = 0 points     RCRI Interpretation: 0 points: Class I (very low risk - 0.4% complication rate)    Signed Electronically by: Marina Kyle PA-C  A copy of this evaluation report is provided to the requesting physician.

## 2025-04-08 LAB
T4, FREE, NON-DIALYSIS - QUEST: 2.1 NG/DL (ref 0.8–1.8)
TSH SERPL-ACNC: 0.01 MIU/L (ref 0.4–4.5)

## 2025-04-08 RX ORDER — LEVOTHYROXINE SODIUM 125 UG/1
125 TABLET ORAL
Qty: 60 TABLET | Refills: 0 | Status: SHIPPED | OUTPATIENT
Start: 2025-04-08 | End: 2025-06-07

## 2025-05-10 DIAGNOSIS — G47.8 OTHER SLEEP DISORDERS: ICD-10-CM

## 2025-05-12 RX ORDER — TRAZODONE HYDROCHLORIDE 100 MG/1
100 TABLET ORAL AT BEDTIME
COMMUNITY
Start: 2025-05-12

## 2025-05-12 NOTE — TELEPHONE ENCOUNTER
Niecy Toro is requesting a refill of:    Refused Prescriptions:                       Disp   Refills    traZODone (DESYREL) 100 MG tablet [Pharmac*                Sig: TAKE 1 TABLET(100 MG) BY MOUTH AT BEDTIME  Refused By: SERENA DUNHAM  Reason for Refusal: Patient has requested refill too soon    Refills sent on 12/24/24 for 90 tab with 1 refill

## 2025-06-17 ENCOUNTER — OFFICE VISIT (OUTPATIENT)
Dept: FAMILY MEDICINE | Facility: CLINIC | Age: 70
End: 2025-06-17

## 2025-06-17 VITALS
WEIGHT: 118.2 LBS | DIASTOLIC BLOOD PRESSURE: 79 MMHG | TEMPERATURE: 97.9 F | HEART RATE: 68 BPM | OXYGEN SATURATION: 95 % | SYSTOLIC BLOOD PRESSURE: 141 MMHG | BODY MASS INDEX: 21.62 KG/M2

## 2025-06-17 DIAGNOSIS — E03.8 OTHER SPECIFIED HYPOTHYROIDISM: Primary | ICD-10-CM

## 2025-06-17 DIAGNOSIS — F41.1 GENERALIZED ANXIETY DISORDER: ICD-10-CM

## 2025-06-17 DIAGNOSIS — F32.89 OTHER DEPRESSION: ICD-10-CM

## 2025-06-17 DIAGNOSIS — G47.33 OBSTRUCTIVE SLEEP APNEA: Chronic | ICD-10-CM

## 2025-06-17 DIAGNOSIS — R03.0 ELEVATED BLOOD PRESSURE READING WITHOUT DIAGNOSIS OF HYPERTENSION: ICD-10-CM

## 2025-06-17 DIAGNOSIS — M81.0 AGE-RELATED OSTEOPOROSIS WITHOUT CURRENT PATHOLOGICAL FRACTURE: ICD-10-CM

## 2025-06-17 DIAGNOSIS — G47.09 OTHER INSOMNIA: ICD-10-CM

## 2025-06-17 DIAGNOSIS — E78.49 OTHER HYPERLIPIDEMIA: ICD-10-CM

## 2025-06-17 PROCEDURE — 99214 OFFICE O/P EST MOD 30 MIN: CPT

## 2025-06-17 PROCEDURE — 36415 COLL VENOUS BLD VENIPUNCTURE: CPT

## 2025-06-17 PROCEDURE — 96127 BRIEF EMOTIONAL/BEHAV ASSMT: CPT

## 2025-06-17 RX ORDER — PSEUDOEPHED/ACETAMINOPH/DIPHEN 30MG-500MG
TABLET ORAL PRN
COMMUNITY
Start: 2025-04-15

## 2025-06-17 RX ORDER — SIMVASTATIN 10 MG
10 TABLET ORAL EVERY EVENING
Qty: 90 TABLET | Refills: 1 | Status: SHIPPED | OUTPATIENT
Start: 2025-06-17

## 2025-06-17 RX ORDER — DULOXETIN HYDROCHLORIDE 60 MG/1
60 CAPSULE, DELAYED RELEASE ORAL DAILY
Qty: 90 CAPSULE | Refills: 1 | Status: SHIPPED | OUTPATIENT
Start: 2025-06-17

## 2025-06-17 RX ORDER — TRAZODONE HYDROCHLORIDE 100 MG/1
100 TABLET ORAL AT BEDTIME
Qty: 90 TABLET | Refills: 1 | Status: SHIPPED | OUTPATIENT
Start: 2025-06-17

## 2025-06-17 RX ORDER — AMOXICILLIN 500 MG/1
CAPSULE ORAL PRN
COMMUNITY
Start: 2025-05-12 | End: 2025-06-17

## 2025-06-17 ASSESSMENT — ANXIETY QUESTIONNAIRES
1. FEELING NERVOUS, ANXIOUS, OR ON EDGE: NOT AT ALL
GAD7 TOTAL SCORE: 0
3. WORRYING TOO MUCH ABOUT DIFFERENT THINGS: NOT AT ALL
2. NOT BEING ABLE TO STOP OR CONTROL WORRYING: NOT AT ALL
5. BEING SO RESTLESS THAT IT IS HARD TO SIT STILL: NOT AT ALL
IF YOU CHECKED OFF ANY PROBLEMS ON THIS QUESTIONNAIRE, HOW DIFFICULT HAVE THESE PROBLEMS MADE IT FOR YOU TO DO YOUR WORK, TAKE CARE OF THINGS AT HOME, OR GET ALONG WITH OTHER PEOPLE: NOT DIFFICULT AT ALL
7. FEELING AFRAID AS IF SOMETHING AWFUL MIGHT HAPPEN: NOT AT ALL
GAD7 TOTAL SCORE: 0
6. BECOMING EASILY ANNOYED OR IRRITABLE: NOT AT ALL

## 2025-06-17 ASSESSMENT — PATIENT HEALTH QUESTIONNAIRE - PHQ9
SUM OF ALL RESPONSES TO PHQ QUESTIONS 1-9: 0
5. POOR APPETITE OR OVEREATING: NOT AT ALL

## 2025-06-17 NOTE — PROGRESS NOTES
Assessment & Plan     1. Other specified hypothyroidism (Primary)  - Labs pending, patient will be notified of results and next steps on my chart. Will send in RX for Levothyroxine pending lab work.   - VENOUS COLLECTION  - TSH ([facility])  - T4 FREE ([facility])    2. Other insomnia  - Well controlled, refills for Trazodone 100 mg nightly sent in for 6 months.   - traZODone (DESYREL) 100 MG tablet; Take 1 tablet (100 mg) by mouth at bedtime.  Dispense: 90 tablet; Refill: 1    3. Obstructive sleep apnea  - Well controlled, continue to wear nightly CPAP.     4. Other depression  - Well controlled, continue Duloxetine 60 mg daily, refills sent in for 6 months.   - DULoxetine (CYMBALTA) 60 MG capsule; Take 1 capsule (60 mg) by mouth daily.  Dispense: 90 capsule; Refill: 1    5. Generalized anxiety disorder  - Well controlled, continue Duloxetine 60 mg daily, refills sent in for 6 months.  - DULoxetine (CYMBALTA) 60 MG capsule; Take 1 capsule (60 mg) by mouth daily.  Dispense: 90 capsule; Refill: 1    6. Other hyperlipidemia  - Well controlled, refills for Simvastatin 10 mg daily sent in for 6 months, will check lipid panel at next med check 12/2025.   - simvastatin (ZOCOR) 10 MG tablet; Take 1 tablet (10 mg) by mouth every evening.  Dispense: 90 tablet; Refill: 1    7. Age-related osteoporosis without current pathological fracture  - Agreed to discontinue Fosamax due to bone loss in jaw, will have Hailee plan to see endocrinology to further discuss other treatment options for osteoporosis, referral placed to [place].   - Adult Endocrinology  Referral - To [facility] Location; Future    8. Elevated blood pressure reading without diagnosis of hypertension  - Noted to have elevated BP today at visit, discussed BP goal is to be <130/90. She has a monitor at home and will check her BP 1-2 times a week and keep a log and if elevated schedule a follow up appointment.     Follow up in 6 months for fasting medication  recheck. Reasons to follow-up sooner or seek emergent care reviewed.     Marina Kyle PA-C  [facility]       Subjective     Niecy Toro is a 69 year old female who presents to clinic today for the following health issues:    HPI   Chief Complaint   Patient presents with    Recheck Medication     Not fasting, Came in for med check ,wants to get blood work for thyroid, stopped taking alendronate, wants to see if there are any other options.      Hailee presents for a non-fasting medication recheck.     -Hypothyroidism: Currently taking Levothyroxine 125 mcg every morning on an empty stomach before all of her other medications with a glass of water. Dose was reduced back in 04/2025 from 150 mcg as her T4 level was hyperthyroid at 1.2. She denies any symptoms of hyper or hypothyroidism.     -Insomnia: Currently taking Trazodone 100 mg nightly, works well, helps her fall and stay asleep. Denies any grogginess in the morning. Wears CPAP nightly for sleep apnea, which she follows for this for at [facility].     -Depression/anxiety: Currently takes Duloxetine 60 mg daily every early afternoon, feels depression and anxiety are both well controlled. No panic attacks or any SI/HI. Previously did therapy but not currently.     -Hyperlipidemia: Currently takes Simvastatin 20 mg every evening, denies any side effects, last lipid panel in 12/2024 was WNL. Diet has been good and healthy overall, has not been as active as usual as is still recovering from her bunion surgery.     -Osteoporosis: Was started on Fosamax 70 mg weekly back in 02/2025 however notes stopped taking medication about a month ago as a couple of months ago found out from her dentist that she is having some bone loss from the right side of her lower jaw, getting this repaired currently with dentist. Due to risk of osteonecrosis of jaw from Fosamax, she stopped taking medication. Would like to discuss other medication options. Is still  taking daily vitamin D3, calcium, and trying to stay active and do weight bearing activities.     Daily medications reviewed.       Objective    BP (!) 141/79 (BP Location: Right arm, Patient Position: Sitting, Cuff Size: Adult Regular)   Pulse 68   Temp 97.9  F (36.6  C) (Temporal)   Wt 53.6 kg (118 lb 3.2 oz)   SpO2 95%   BMI 21.62 kg/m    Body mass index is 21.62 kg/m .    Physical Examination:  GENERAL: healthy, alert and no distress  EYES: Eyes grossly normal to inspection, PERRL and conjunctivae and sclerae normal  HENT: mouth without ulcers or lesions  NECK: no adenopathy, no asymmetry, masses, or scars and thyroid normal to palpation  RESP: lungs clear to auscultation - no rales, rhonchi or wheezes  CV: regular rate and rhythm, normal S1 S2, no S3 or S4, no murmur, click or rub, no peripheral edema   ABDOMEN: soft and non-tender  MS: no gross musculoskeletal defects noted, no edema  SKIN: no suspicious lesions or rashes  PSYCH: mentation appears normal, affect normal/bright    Lab work pending.         6/17/2025     1:04 PM   PHQ   PHQ-9 Total Score 0   Q9: Thoughts of better off dead/self-harm past 2 weeks Not at all          6/17/2025     1:04 PM   ANGELIC-7 SCORE   Total Score 0

## 2025-06-17 NOTE — NURSING NOTE
Chief Complaint   Patient presents with    Recheck Medication     Not fasting, Came in for med check ,wants to get blood work for thyroid, stopped taking alendronate, wants to see if there are any other options.     Pre-visit Screening:  Immunizations:  not up to date - Covid, zosters  Colonoscopy:  is up to date  Mammogram: is up to date  Asthma Action Test/Plan:  na  PHQ9:  given today  GAD7:  given today  Questioned patient about current smoking habits Pt. Occasional pot  Ok to leave detailed message on voice mail for today's visit only yes, phone # [phone] (home)

## 2025-06-18 LAB
T4, FREE, NON-DIALYSIS - QUEST: 1.6 NG/DL (ref 0.8–1.8)
TSH SERPL-ACNC: 0.01 MIU/L (ref 0.4–4.5)

## 2025-06-19 ENCOUNTER — RESULTS FOLLOW-UP (OUTPATIENT)
Dept: FAMILY MEDICINE | Facility: CLINIC | Age: 70
End: 2025-06-19

## 2025-06-19 RX ORDER — LEVOTHYROXINE SODIUM 100 UG/1
100 TABLET ORAL
Qty: 60 TABLET | Refills: 0 | Status: SHIPPED | OUTPATIENT
Start: 2025-06-19

## (undated) DEVICE — SOL NACL 0.9% INJ 250ML BAG 2B1322Q

## (undated) DEVICE — SU MONOCRYL 3-0 PS-2 27" Y427H

## (undated) DEVICE — SU VICRYL 0 CTX CR 8X18" J764D

## (undated) DEVICE — NDL 19GA 1.5"

## (undated) DEVICE — ESU BIPOLAR SEALER AQUAMANTYS 6MM 23-112-1

## (undated) DEVICE — GLOVE PROTEXIS POWDER FREE 8.5 ORTHOPEDIC 2D73ET85

## (undated) DEVICE — SU VICRYL 2-0 CP-1 27" UND J266H

## (undated) DEVICE — SU STRATAFIX PDS PLUS 0 CT 45CM SXPP1A406

## (undated) DEVICE — SOL NACL 0.9% IRRIG 1000ML BOTTLE 2F7124

## (undated) DEVICE — GLOVE BIOGEL PI SZ 8.5 40885

## (undated) DEVICE — NDL 22GA 1.5"

## (undated) DEVICE — SOL NACL 0.9% IRRIG 1000ML BOTTLE 07138-09

## (undated) DEVICE — PACK TOTAL KNEE SOP15TKFSD

## (undated) DEVICE — MANIFOLD NEPTUNE 4 PORT 700-20

## (undated) DEVICE — Device

## (undated) DEVICE — SOL WATER IRRIG 1000ML BOTTLE 2F7114

## (undated) DEVICE — BONE CLEANING TIP INTERPULSE  0210-010-000

## (undated) DEVICE — DRAPE IOBAN INCISE 23X17" 6650EZ

## (undated) DEVICE — HOOD SURG T7PLUS PEEL AWAY FACE SHIELD STRL LF 0416-801-100

## (undated) DEVICE — ESU GROUND PAD UNIVERSAL W/O CORD

## (undated) DEVICE — SOLUTION WOUND CLEANSING 3/4OZ 10% PVP EA-L3011FB-50

## (undated) DEVICE — GLOVE PROTEXIS W/NEU-THERA 8.5  2D73TE85

## (undated) DEVICE — DRAPE C-ARM 60X42" 1013

## (undated) DEVICE — KIT PATIENT CARE HANA TABLE PROFX SUPINE 6855

## (undated) DEVICE — SU STRATAFIX PDS PLUS 2-0 SPIRAL CT-1 30CM SXPP1B410

## (undated) DEVICE — SUCTION IRR SYSTEM W/O TIP INTERPULSE HANDPIECE 0210-100-000

## (undated) DEVICE — SOL NACL 0.9% INJ 1000ML BAG 2B1324X

## (undated) DEVICE — DRAPE CONVERTORS U-DRAPE 60X72" 8476

## (undated) DEVICE — BLADE SAW SAGITTAL STRK 19.5X95X1.27MM 2108-109-000S15

## (undated) DEVICE — PREP CHLORAPREP 26ML TINTED ORANGE  260815

## (undated) DEVICE — LINEN TOWEL PACK X5 5464

## (undated) DEVICE — BLADE SAW RECIP STRK 70X12.5X1.2MM 0277-096-281

## (undated) DEVICE — DRAPE STERI U 1015

## (undated) DEVICE — DRAPE SHEET REV FOLD 3/4 9349

## (undated) DEVICE — GLOVE PROTEXIS BLUE W/NEU-THERA 7.0  2D73EB70

## (undated) DEVICE — GLOVE PROTEXIS MICRO 6.5  2D73PM65

## (undated) DEVICE — GLOVE PROTEXIS W/NEU-THERA 6.5  2D73TE65

## (undated) DEVICE — SUCTION TIP YANKAUER STR K87

## (undated) DEVICE — WRAP EZY KNEE

## (undated) DEVICE — ESU PENCIL W/SMOKE EVAC NEPTUNE STRYKER 0703-046-000

## (undated) DEVICE — DRAPE IOBAN ISOLATION VERTICAL 320X21CM 6617

## (undated) DEVICE — SYR 50ML LL W/O NDL 309653

## (undated) DEVICE — BLADE SAW SAGITTAL STRK 18X90X1.27MM HD SYS 6 6118-127-090

## (undated) DEVICE — CLOSURE SYS SKIN PREMIERPRO EXOFIN FUSION 4X22CM STRL 3472

## (undated) DEVICE — PACK TOTAL HIP W/U DRAPE SOP15HUFSC

## (undated) DEVICE — BLADE SAW SAGITTAL STRK SHORT 35.5X9X0.64MM 2108-148-000

## (undated) DEVICE — BONE CEMENT MIXEVAC III HI VAC KIT  0206-015-000

## (undated) DEVICE — SU ETHIBOND 2 V-37 4X30" MX69G

## (undated) RX ORDER — FENTANYL CITRATE 50 UG/ML
INJECTION, SOLUTION INTRAMUSCULAR; INTRAVENOUS
Status: DISPENSED
Start: 2023-12-28

## (undated) RX ORDER — PROPOFOL 10 MG/ML
INJECTION, EMULSION INTRAVENOUS
Status: DISPENSED
Start: 2019-03-22

## (undated) RX ORDER — FENTANYL CITRATE 50 UG/ML
INJECTION, SOLUTION INTRAMUSCULAR; INTRAVENOUS
Status: DISPENSED
Start: 2019-03-22

## (undated) RX ORDER — DEXAMETHASONE SODIUM PHOSPHATE 4 MG/ML
INJECTION, SOLUTION INTRA-ARTICULAR; INTRALESIONAL; INTRAMUSCULAR; INTRAVENOUS; SOFT TISSUE
Status: DISPENSED
Start: 2023-12-28

## (undated) RX ORDER — ACETAMINOPHEN 325 MG/1
TABLET ORAL
Status: DISPENSED
Start: 2023-12-28

## (undated) RX ORDER — HYDROMORPHONE HYDROCHLORIDE 1 MG/ML
INJECTION, SOLUTION INTRAMUSCULAR; INTRAVENOUS; SUBCUTANEOUS
Status: DISPENSED
Start: 2019-03-22

## (undated) RX ORDER — PROPOFOL 10 MG/ML
INJECTION, EMULSION INTRAVENOUS
Status: DISPENSED
Start: 2023-12-28

## (undated) RX ORDER — VANCOMYCIN HYDROCHLORIDE 1 G/20ML
INJECTION, POWDER, LYOPHILIZED, FOR SOLUTION INTRAVENOUS
Status: DISPENSED
Start: 2023-12-28

## (undated) RX ORDER — ACETAMINOPHEN 500 MG
TABLET ORAL
Status: DISPENSED
Start: 2019-03-22

## (undated) RX ORDER — DEXAMETHASONE SODIUM PHOSPHATE 4 MG/ML
INJECTION, SOLUTION INTRA-ARTICULAR; INTRALESIONAL; INTRAMUSCULAR; INTRAVENOUS; SOFT TISSUE
Status: DISPENSED
Start: 2019-03-22

## (undated) RX ORDER — ONDANSETRON 2 MG/ML
INJECTION INTRAMUSCULAR; INTRAVENOUS
Status: DISPENSED
Start: 2019-03-22

## (undated) RX ORDER — LABETALOL HYDROCHLORIDE 5 MG/ML
INJECTION, SOLUTION INTRAVENOUS
Status: DISPENSED
Start: 2023-12-28

## (undated) RX ORDER — LIDOCAINE HYDROCHLORIDE 20 MG/ML
INJECTION, SOLUTION EPIDURAL; INFILTRATION; INTRACAUDAL; PERINEURAL
Status: DISPENSED
Start: 2019-03-22

## (undated) RX ORDER — CELECOXIB 200 MG/1
CAPSULE ORAL
Status: DISPENSED
Start: 2019-03-22

## (undated) RX ORDER — NEOSTIGMINE METHYLSULFATE 1 MG/ML
VIAL (ML) INJECTION
Status: DISPENSED
Start: 2023-12-28

## (undated) RX ORDER — CELECOXIB 200 MG/1
CAPSULE ORAL
Status: DISPENSED
Start: 2023-12-28

## (undated) RX ORDER — VANCOMYCIN HYDROCHLORIDE 1 G/20ML
INJECTION, POWDER, LYOPHILIZED, FOR SOLUTION INTRAVENOUS
Status: DISPENSED
Start: 2019-03-22

## (undated) RX ORDER — KETOROLAC TROMETHAMINE 30 MG/ML
INJECTION, SOLUTION INTRAMUSCULAR; INTRAVENOUS
Status: DISPENSED
Start: 2019-03-22

## (undated) RX ORDER — CEFAZOLIN SODIUM/WATER 2 G/20 ML
SYRINGE (ML) INTRAVENOUS
Status: DISPENSED
Start: 2023-12-28

## (undated) RX ORDER — BUPIVACAINE HYDROCHLORIDE AND EPINEPHRINE 5; 5 MG/ML; UG/ML
INJECTION, SOLUTION EPIDURAL; INTRACAUDAL; PERINEURAL
Status: DISPENSED
Start: 2019-03-22

## (undated) RX ORDER — CEFAZOLIN SODIUM 2 G/100ML
INJECTION, SOLUTION INTRAVENOUS
Status: DISPENSED
Start: 2019-03-22

## (undated) RX ORDER — TRANEXAMIC ACID 650 MG/1
TABLET ORAL
Status: DISPENSED
Start: 2023-12-28

## (undated) RX ORDER — HYDROMORPHONE HYDROCHLORIDE 1 MG/ML
INJECTION, SOLUTION INTRAMUSCULAR; INTRAVENOUS; SUBCUTANEOUS
Status: DISPENSED
Start: 2023-12-28

## (undated) RX ORDER — PREGABALIN 150 MG/1
CAPSULE ORAL
Status: DISPENSED
Start: 2019-03-22

## (undated) RX ORDER — ONDANSETRON 2 MG/ML
INJECTION INTRAMUSCULAR; INTRAVENOUS
Status: DISPENSED
Start: 2023-12-28

## (undated) RX ORDER — PREGABALIN 150 MG/1
CAPSULE ORAL
Status: DISPENSED
Start: 2023-12-28

## (undated) RX ORDER — CEFAZOLIN SODIUM 1 G/3ML
INJECTION, POWDER, FOR SOLUTION INTRAMUSCULAR; INTRAVENOUS
Status: DISPENSED
Start: 2019-03-22

## (undated) RX ORDER — GLYCOPYRROLATE 0.2 MG/ML
INJECTION, SOLUTION INTRAMUSCULAR; INTRAVENOUS
Status: DISPENSED
Start: 2023-12-28